# Patient Record
Sex: FEMALE | Race: WHITE | NOT HISPANIC OR LATINO | Employment: FULL TIME | URBAN - METROPOLITAN AREA
[De-identification: names, ages, dates, MRNs, and addresses within clinical notes are randomized per-mention and may not be internally consistent; named-entity substitution may affect disease eponyms.]

---

## 2019-08-02 ENCOUNTER — IMMUNIZATION (OUTPATIENT)
Dept: PHARMACY | Facility: CLINIC | Age: 39
End: 2019-08-02

## 2021-02-11 ENCOUNTER — CLINICAL SUPPORT (OUTPATIENT)
Dept: URGENT CARE | Facility: CLINIC | Age: 41
End: 2021-02-11
Payer: COMMERCIAL

## 2021-02-11 DIAGNOSIS — Z20.822 EXPOSURE TO COVID-19 VIRUS: Primary | ICD-10-CM

## 2021-02-11 LAB
CTP QC/QA: YES
SARS-COV-2 RDRP RESP QL NAA+PROBE: POSITIVE

## 2021-02-11 PROCEDURE — U0002 COVID-19 LAB TEST NON-CDC: HCPCS | Mod: QW,S$GLB,, | Performed by: PHYSICIAN ASSISTANT

## 2021-02-11 PROCEDURE — U0002: ICD-10-PCS | Mod: QW,S$GLB,, | Performed by: PHYSICIAN ASSISTANT

## 2021-02-11 PROCEDURE — 99211 PR OFFICE/OUTPT VISIT, EST, LEVL I: ICD-10-PCS | Mod: S$GLB,,, | Performed by: PHYSICIAN ASSISTANT

## 2021-02-11 PROCEDURE — 99211 OFF/OP EST MAY X REQ PHY/QHP: CPT | Mod: S$GLB,,, | Performed by: PHYSICIAN ASSISTANT

## 2021-02-12 DIAGNOSIS — U07.1 COVID-19 VIRUS DETECTED: ICD-10-CM

## 2021-03-01 ENCOUNTER — OFFICE VISIT (OUTPATIENT)
Dept: INTERNAL MEDICINE | Facility: CLINIC | Age: 41
End: 2021-03-01
Payer: COMMERCIAL

## 2021-03-01 ENCOUNTER — LAB VISIT (OUTPATIENT)
Dept: LAB | Facility: HOSPITAL | Age: 41
End: 2021-03-01
Attending: FAMILY MEDICINE
Payer: COMMERCIAL

## 2021-03-01 VITALS
HEART RATE: 86 BPM | TEMPERATURE: 99 F | DIASTOLIC BLOOD PRESSURE: 96 MMHG | OXYGEN SATURATION: 98 % | HEIGHT: 70 IN | BODY MASS INDEX: 41.95 KG/M2 | WEIGHT: 293 LBS | SYSTOLIC BLOOD PRESSURE: 154 MMHG

## 2021-03-01 DIAGNOSIS — E66.01 MORBID OBESITY WITH BMI OF 50.0-59.9, ADULT: ICD-10-CM

## 2021-03-01 DIAGNOSIS — Z11.4 SCREENING FOR HIV (HUMAN IMMUNODEFICIENCY VIRUS): ICD-10-CM

## 2021-03-01 DIAGNOSIS — Z00.00 ANNUAL PHYSICAL EXAM: Primary | ICD-10-CM

## 2021-03-01 DIAGNOSIS — Z98.84 HISTORY OF GASTRIC BYPASS: ICD-10-CM

## 2021-03-01 DIAGNOSIS — Z12.31 ENCOUNTER FOR SCREENING MAMMOGRAM FOR MALIGNANT NEOPLASM OF BREAST: ICD-10-CM

## 2021-03-01 DIAGNOSIS — Z01.419 ENCOUNTER FOR GYNECOLOGICAL EXAMINATION: ICD-10-CM

## 2021-03-01 DIAGNOSIS — Z00.00 ANNUAL PHYSICAL EXAM: ICD-10-CM

## 2021-03-01 DIAGNOSIS — Z11.59 NEED FOR HEPATITIS C SCREENING TEST: ICD-10-CM

## 2021-03-01 DIAGNOSIS — R03.0 ELEVATED BLOOD PRESSURE READING WITHOUT DIAGNOSIS OF HYPERTENSION: ICD-10-CM

## 2021-03-01 LAB
ERYTHROCYTE [DISTWIDTH] IN BLOOD BY AUTOMATED COUNT: 19.2 % (ref 11.5–14.5)
HCT VFR BLD AUTO: 33.7 % (ref 37–48.5)
HGB BLD-MCNC: 9.3 G/DL (ref 12–16)
MCH RBC QN AUTO: 20.4 PG (ref 27–31)
MCHC RBC AUTO-ENTMCNC: 27.6 G/DL (ref 32–36)
MCV RBC AUTO: 74 FL (ref 82–98)
PLATELET # BLD AUTO: 418 K/UL (ref 150–350)
PMV BLD AUTO: 10.2 FL (ref 9.2–12.9)
RBC # BLD AUTO: 4.56 M/UL (ref 4–5.4)
WBC # BLD AUTO: 10.83 K/UL (ref 3.9–12.7)

## 2021-03-01 PROCEDURE — 80061 LIPID PANEL: CPT | Performed by: FAMILY MEDICINE

## 2021-03-01 PROCEDURE — 86703 HIV-1/HIV-2 1 RESULT ANTBDY: CPT

## 2021-03-01 PROCEDURE — 99999 PR PBB SHADOW E&M-EST. PATIENT-LVL IV: CPT | Mod: PBBFAC,,, | Performed by: FAMILY MEDICINE

## 2021-03-01 PROCEDURE — 99386 PR PREVENTIVE VISIT,NEW,40-64: ICD-10-PCS | Mod: S$GLB,,, | Performed by: FAMILY MEDICINE

## 2021-03-01 PROCEDURE — 83036 HEMOGLOBIN GLYCOSYLATED A1C: CPT

## 2021-03-01 PROCEDURE — 1126F PR PAIN SEVERITY QUANTIFIED, NO PAIN PRESENT: ICD-10-PCS | Mod: S$GLB,,, | Performed by: FAMILY MEDICINE

## 2021-03-01 PROCEDURE — 99999 PR PBB SHADOW E&M-EST. PATIENT-LVL IV: ICD-10-PCS | Mod: PBBFAC,,, | Performed by: FAMILY MEDICINE

## 2021-03-01 PROCEDURE — 3008F BODY MASS INDEX DOCD: CPT | Mod: CPTII,S$GLB,, | Performed by: FAMILY MEDICINE

## 2021-03-01 PROCEDURE — 84443 ASSAY THYROID STIM HORMONE: CPT | Performed by: FAMILY MEDICINE

## 2021-03-01 PROCEDURE — 99386 PREV VISIT NEW AGE 40-64: CPT | Mod: S$GLB,,, | Performed by: FAMILY MEDICINE

## 2021-03-01 PROCEDURE — 86803 HEPATITIS C AB TEST: CPT

## 2021-03-01 PROCEDURE — 3008F PR BODY MASS INDEX (BMI) DOCUMENTED: ICD-10-PCS | Mod: CPTII,S$GLB,, | Performed by: FAMILY MEDICINE

## 2021-03-01 PROCEDURE — 36415 COLL VENOUS BLD VENIPUNCTURE: CPT

## 2021-03-01 PROCEDURE — 80053 COMPREHEN METABOLIC PANEL: CPT | Performed by: FAMILY MEDICINE

## 2021-03-01 PROCEDURE — 1126F AMNT PAIN NOTED NONE PRSNT: CPT | Mod: S$GLB,,, | Performed by: FAMILY MEDICINE

## 2021-03-01 PROCEDURE — 85027 COMPLETE CBC AUTOMATED: CPT

## 2021-03-01 PROCEDURE — 82306 VITAMIN D 25 HYDROXY: CPT | Performed by: FAMILY MEDICINE

## 2021-03-02 LAB
ESTIMATED AVG GLUCOSE: 117 MG/DL (ref 68–131)
HBA1C MFR BLD: 5.7 % (ref 4–5.6)
HCV AB SERPL QL IA: NEGATIVE

## 2021-03-03 LAB
25(OH)D3+25(OH)D2 SERPL-MCNC: 31 NG/ML (ref 30–96)
ALBUMIN SERPL BCP-MCNC: 3.5 G/DL (ref 3.5–5.2)
ALP SERPL-CCNC: 89 U/L (ref 55–135)
ALT SERPL W/O P-5'-P-CCNC: 28 U/L (ref 10–44)
ANION GAP SERPL CALC-SCNC: 9 MMOL/L (ref 8–16)
AST SERPL-CCNC: 24 U/L (ref 10–40)
BILIRUB SERPL-MCNC: 0.3 MG/DL (ref 0.1–1)
BUN SERPL-MCNC: 13 MG/DL (ref 6–20)
CALCIUM SERPL-MCNC: 8.7 MG/DL (ref 8.7–10.5)
CHLORIDE SERPL-SCNC: 104 MMOL/L (ref 95–110)
CHOLEST SERPL-MCNC: 166 MG/DL (ref 120–199)
CHOLEST/HDLC SERPL: 3.3 {RATIO} (ref 2–5)
CO2 SERPL-SCNC: 25 MMOL/L (ref 23–29)
CREAT SERPL-MCNC: 0.9 MG/DL (ref 0.5–1.4)
EST. GFR  (AFRICAN AMERICAN): >60 ML/MIN/1.73 M^2
EST. GFR  (NON AFRICAN AMERICAN): >60 ML/MIN/1.73 M^2
GLUCOSE SERPL-MCNC: 82 MG/DL (ref 70–110)
HDLC SERPL-MCNC: 51 MG/DL (ref 40–75)
HDLC SERPL: 30.7 % (ref 20–50)
HIV 1+2 AB+HIV1 P24 AG SERPL QL IA: NEGATIVE
LDLC SERPL CALC-MCNC: 88.4 MG/DL (ref 63–159)
NONHDLC SERPL-MCNC: 115 MG/DL
POTASSIUM SERPL-SCNC: 4.1 MMOL/L (ref 3.5–5.1)
PROT SERPL-MCNC: 7.4 G/DL (ref 6–8.4)
SODIUM SERPL-SCNC: 138 MMOL/L (ref 136–145)
TRIGL SERPL-MCNC: 133 MG/DL (ref 30–150)
TSH SERPL DL<=0.005 MIU/L-ACNC: 1.2 UIU/ML (ref 0.4–4)

## 2021-03-05 ENCOUNTER — TELEPHONE (OUTPATIENT)
Dept: GASTROENTEROLOGY | Facility: CLINIC | Age: 41
End: 2021-03-05

## 2021-03-29 ENCOUNTER — OFFICE VISIT (OUTPATIENT)
Dept: INTERNAL MEDICINE | Facility: CLINIC | Age: 41
End: 2021-03-29
Payer: COMMERCIAL

## 2021-03-29 VITALS
SYSTOLIC BLOOD PRESSURE: 140 MMHG | TEMPERATURE: 99 F | OXYGEN SATURATION: 97 % | BODY MASS INDEX: 43.4 KG/M2 | DIASTOLIC BLOOD PRESSURE: 84 MMHG | WEIGHT: 293 LBS | HEIGHT: 69 IN | HEART RATE: 89 BPM

## 2021-03-29 DIAGNOSIS — Z98.84 HISTORY OF GASTRIC BYPASS: ICD-10-CM

## 2021-03-29 DIAGNOSIS — R73.03 PREDIABETES: Primary | ICD-10-CM

## 2021-03-29 DIAGNOSIS — E66.01 MORBID OBESITY WITH BMI OF 50.0-59.9, ADULT: ICD-10-CM

## 2021-03-29 DIAGNOSIS — R03.0 ELEVATED BLOOD PRESSURE READING WITHOUT DIAGNOSIS OF HYPERTENSION: ICD-10-CM

## 2021-03-29 PROCEDURE — 3008F BODY MASS INDEX DOCD: CPT | Mod: CPTII,S$GLB,, | Performed by: FAMILY MEDICINE

## 2021-03-29 PROCEDURE — 1126F PR PAIN SEVERITY QUANTIFIED, NO PAIN PRESENT: ICD-10-PCS | Mod: S$GLB,,, | Performed by: FAMILY MEDICINE

## 2021-03-29 PROCEDURE — 99214 OFFICE O/P EST MOD 30 MIN: CPT | Mod: S$GLB,,, | Performed by: FAMILY MEDICINE

## 2021-03-29 PROCEDURE — 99214 PR OFFICE/OUTPT VISIT, EST, LEVL IV, 30-39 MIN: ICD-10-PCS | Mod: S$GLB,,, | Performed by: FAMILY MEDICINE

## 2021-03-29 PROCEDURE — 3008F PR BODY MASS INDEX (BMI) DOCUMENTED: ICD-10-PCS | Mod: CPTII,S$GLB,, | Performed by: FAMILY MEDICINE

## 2021-03-29 PROCEDURE — 1126F AMNT PAIN NOTED NONE PRSNT: CPT | Mod: S$GLB,,, | Performed by: FAMILY MEDICINE

## 2021-03-29 PROCEDURE — 99999 PR PBB SHADOW E&M-EST. PATIENT-LVL IV: CPT | Mod: PBBFAC,,, | Performed by: FAMILY MEDICINE

## 2021-03-29 PROCEDURE — 99999 PR PBB SHADOW E&M-EST. PATIENT-LVL IV: ICD-10-PCS | Mod: PBBFAC,,, | Performed by: FAMILY MEDICINE

## 2021-03-29 RX ORDER — HYDROCHLOROTHIAZIDE 25 MG/1
25 TABLET ORAL DAILY
Qty: 90 TABLET | Refills: 0 | Status: SHIPPED | OUTPATIENT
Start: 2021-03-29 | End: 2021-06-21 | Stop reason: SDUPTHER

## 2021-05-05 ENCOUNTER — PATIENT OUTREACH (OUTPATIENT)
Dept: ADMINISTRATIVE | Facility: OTHER | Age: 41
End: 2021-05-05

## 2021-05-06 ENCOUNTER — OFFICE VISIT (OUTPATIENT)
Dept: GASTROENTEROLOGY | Facility: CLINIC | Age: 41
End: 2021-05-06
Payer: COMMERCIAL

## 2021-05-06 ENCOUNTER — TELEPHONE (OUTPATIENT)
Dept: GASTROENTEROLOGY | Facility: CLINIC | Age: 41
End: 2021-05-06

## 2021-05-06 VITALS
WEIGHT: 293 LBS | HEART RATE: 87 BPM | SYSTOLIC BLOOD PRESSURE: 120 MMHG | OXYGEN SATURATION: 98 % | BODY MASS INDEX: 43.4 KG/M2 | DIASTOLIC BLOOD PRESSURE: 84 MMHG | HEIGHT: 69 IN

## 2021-05-06 DIAGNOSIS — Z98.84 HISTORY OF GASTRIC BYPASS: ICD-10-CM

## 2021-05-06 DIAGNOSIS — Z98.84 HISTORY OF GASTRIC BYPASS: Primary | ICD-10-CM

## 2021-05-06 DIAGNOSIS — E66.01 CLASS 3 SEVERE OBESITY WITH BODY MASS INDEX (BMI) OF 50.0 TO 59.9 IN ADULT, UNSPECIFIED OBESITY TYPE, UNSPECIFIED WHETHER SERIOUS COMORBIDITY PRESENT: Primary | ICD-10-CM

## 2021-05-06 PROCEDURE — 99204 OFFICE O/P NEW MOD 45 MIN: CPT | Mod: S$GLB,,, | Performed by: INTERNAL MEDICINE

## 2021-05-06 PROCEDURE — 99999 PR PBB SHADOW E&M-EST. PATIENT-LVL IV: ICD-10-PCS | Mod: PBBFAC,,, | Performed by: INTERNAL MEDICINE

## 2021-05-06 PROCEDURE — 99204 PR OFFICE/OUTPT VISIT, NEW, LEVL IV, 45-59 MIN: ICD-10-PCS | Mod: S$GLB,,, | Performed by: INTERNAL MEDICINE

## 2021-05-06 PROCEDURE — 3008F BODY MASS INDEX DOCD: CPT | Mod: CPTII,S$GLB,, | Performed by: INTERNAL MEDICINE

## 2021-05-06 PROCEDURE — 1126F PR PAIN SEVERITY QUANTIFIED, NO PAIN PRESENT: ICD-10-PCS | Mod: S$GLB,,, | Performed by: INTERNAL MEDICINE

## 2021-05-06 PROCEDURE — 3008F PR BODY MASS INDEX (BMI) DOCUMENTED: ICD-10-PCS | Mod: CPTII,S$GLB,, | Performed by: INTERNAL MEDICINE

## 2021-05-06 PROCEDURE — 1126F AMNT PAIN NOTED NONE PRSNT: CPT | Mod: S$GLB,,, | Performed by: INTERNAL MEDICINE

## 2021-05-06 PROCEDURE — 99999 PR PBB SHADOW E&M-EST. PATIENT-LVL IV: CPT | Mod: PBBFAC,,, | Performed by: INTERNAL MEDICINE

## 2021-05-28 ENCOUNTER — TELEPHONE (OUTPATIENT)
Dept: PREADMISSION TESTING | Facility: HOSPITAL | Age: 41
End: 2021-05-28

## 2021-06-21 DIAGNOSIS — R03.0 ELEVATED BLOOD PRESSURE READING WITHOUT DIAGNOSIS OF HYPERTENSION: ICD-10-CM

## 2021-06-21 RX ORDER — HYDROCHLOROTHIAZIDE 25 MG/1
25 TABLET ORAL DAILY
Qty: 90 TABLET | Refills: 3 | Status: SHIPPED | OUTPATIENT
Start: 2021-06-21 | End: 2022-07-03 | Stop reason: SDUPTHER

## 2021-09-16 ENCOUNTER — HOSPITAL ENCOUNTER (OUTPATIENT)
Dept: CARDIOLOGY | Facility: HOSPITAL | Age: 41
Discharge: HOME | End: 2021-09-16
Attending: STUDENT IN AN ORGANIZED HEALTH CARE EDUCATION/TRAINING PROGRAM
Payer: COMMERCIAL

## 2021-09-16 ENCOUNTER — OFFICE VISIT (OUTPATIENT)
Dept: CARDIOLOGY | Facility: CLINIC | Age: 41
End: 2021-09-16
Payer: COMMERCIAL

## 2021-09-16 VITALS
SYSTOLIC BLOOD PRESSURE: 130 MMHG | RESPIRATION RATE: 16 BRPM | OXYGEN SATURATION: 98 % | WEIGHT: 293 LBS | DIASTOLIC BLOOD PRESSURE: 80 MMHG | HEART RATE: 80 BPM | BODY MASS INDEX: 41.95 KG/M2 | HEIGHT: 70 IN

## 2021-09-16 DIAGNOSIS — R07.82 INTERCOSTAL PAIN: ICD-10-CM

## 2021-09-16 DIAGNOSIS — R07.9 CHEST PAIN, UNSPECIFIED TYPE: Primary | ICD-10-CM

## 2021-09-16 DIAGNOSIS — Z98.84 H/O GASTRIC BYPASS: Chronic | ICD-10-CM

## 2021-09-16 DIAGNOSIS — R07.9 CHEST PAIN, UNSPECIFIED TYPE: ICD-10-CM

## 2021-09-16 DIAGNOSIS — R07.89 OTHER CHEST PAIN: Primary | ICD-10-CM

## 2021-09-16 DIAGNOSIS — E66.01 MORBID OBESITY WITH BMI OF 45.0-49.9, ADULT: Chronic | ICD-10-CM

## 2021-09-16 DIAGNOSIS — U07.1 COVID-19 VIRUS INFECTION: ICD-10-CM

## 2021-09-16 PROCEDURE — 93005 ELECTROCARDIOGRAM TRACING: CPT

## 2021-09-16 PROCEDURE — 3075F SYST BP GE 130 - 139MM HG: CPT | Mod: CPTII,S$GLB,, | Performed by: STUDENT IN AN ORGANIZED HEALTH CARE EDUCATION/TRAINING PROGRAM

## 2021-09-16 PROCEDURE — 1159F PR MEDICATION LIST DOCUMENTED IN MEDICAL RECORD: ICD-10-PCS | Mod: CPTII,S$GLB,, | Performed by: STUDENT IN AN ORGANIZED HEALTH CARE EDUCATION/TRAINING PROGRAM

## 2021-09-16 PROCEDURE — 3075F PR MOST RECENT SYSTOLIC BLOOD PRESS GE 130-139MM HG: ICD-10-PCS | Mod: CPTII,S$GLB,, | Performed by: STUDENT IN AN ORGANIZED HEALTH CARE EDUCATION/TRAINING PROGRAM

## 2021-09-16 PROCEDURE — 3079F DIAST BP 80-89 MM HG: CPT | Mod: CPTII,S$GLB,, | Performed by: STUDENT IN AN ORGANIZED HEALTH CARE EDUCATION/TRAINING PROGRAM

## 2021-09-16 PROCEDURE — 93010 EKG 12-LEAD: ICD-10-PCS | Mod: ,,, | Performed by: INTERNAL MEDICINE

## 2021-09-16 PROCEDURE — 3079F PR MOST RECENT DIASTOLIC BLOOD PRESSURE 80-89 MM HG: ICD-10-PCS | Mod: CPTII,S$GLB,, | Performed by: STUDENT IN AN ORGANIZED HEALTH CARE EDUCATION/TRAINING PROGRAM

## 2021-09-16 PROCEDURE — 93010 ELECTROCARDIOGRAM REPORT: CPT | Mod: ,,, | Performed by: INTERNAL MEDICINE

## 2021-09-16 PROCEDURE — 99203 PR OFFICE/OUTPT VISIT, NEW, LEVL III, 30-44 MIN: ICD-10-PCS | Mod: S$GLB,,, | Performed by: STUDENT IN AN ORGANIZED HEALTH CARE EDUCATION/TRAINING PROGRAM

## 2021-09-16 PROCEDURE — 99999 PR PBB SHADOW E&M-NEW PATIENT-LVL III: CPT | Mod: PBBFAC,,, | Performed by: STUDENT IN AN ORGANIZED HEALTH CARE EDUCATION/TRAINING PROGRAM

## 2021-09-16 PROCEDURE — 99203 OFFICE O/P NEW LOW 30 MIN: CPT | Mod: S$GLB,,, | Performed by: STUDENT IN AN ORGANIZED HEALTH CARE EDUCATION/TRAINING PROGRAM

## 2021-09-16 PROCEDURE — 1159F MED LIST DOCD IN RCRD: CPT | Mod: CPTII,S$GLB,, | Performed by: STUDENT IN AN ORGANIZED HEALTH CARE EDUCATION/TRAINING PROGRAM

## 2021-09-16 PROCEDURE — 3008F BODY MASS INDEX DOCD: CPT | Mod: CPTII,S$GLB,, | Performed by: STUDENT IN AN ORGANIZED HEALTH CARE EDUCATION/TRAINING PROGRAM

## 2021-09-16 PROCEDURE — 3008F PR BODY MASS INDEX (BMI) DOCUMENTED: ICD-10-PCS | Mod: CPTII,S$GLB,, | Performed by: STUDENT IN AN ORGANIZED HEALTH CARE EDUCATION/TRAINING PROGRAM

## 2021-09-16 PROCEDURE — 99999 PR PBB SHADOW E&M-NEW PATIENT-LVL III: ICD-10-PCS | Mod: PBBFAC,,, | Performed by: STUDENT IN AN ORGANIZED HEALTH CARE EDUCATION/TRAINING PROGRAM

## 2021-09-16 RX ORDER — HYDROCHLOROTHIAZIDE 25 MG/1
TABLET ORAL
COMMUNITY
Start: 2021-03-19 | End: 2022-07-05

## 2021-09-21 ENCOUNTER — LAB VISIT (OUTPATIENT)
Dept: LAB | Facility: HOSPITAL | Age: 41
End: 2021-09-21
Attending: STUDENT IN AN ORGANIZED HEALTH CARE EDUCATION/TRAINING PROGRAM
Payer: COMMERCIAL

## 2021-09-21 DIAGNOSIS — R07.89 OTHER CHEST PAIN: ICD-10-CM

## 2021-09-21 LAB
ALBUMIN SERPL BCP-MCNC: 3.7 G/DL (ref 3.5–5.2)
ALP SERPL-CCNC: 66 U/L (ref 55–135)
ALT SERPL W/O P-5'-P-CCNC: 25 U/L (ref 10–44)
ANION GAP SERPL CALC-SCNC: 8 MMOL/L (ref 8–16)
AST SERPL-CCNC: 16 U/L (ref 10–40)
BASOPHILS # BLD AUTO: 0.05 K/UL (ref 0–0.2)
BASOPHILS NFR BLD: 0.6 % (ref 0–1.9)
BILIRUB SERPL-MCNC: 0.6 MG/DL (ref 0.1–1)
BUN SERPL-MCNC: 15 MG/DL (ref 6–20)
CALCIUM SERPL-MCNC: 9.4 MG/DL (ref 8.7–10.5)
CHLORIDE SERPL-SCNC: 104 MMOL/L (ref 95–110)
CHOLEST SERPL-MCNC: 157 MG/DL (ref 120–199)
CHOLEST/HDLC SERPL: 3.9 {RATIO} (ref 2–5)
CO2 SERPL-SCNC: 27 MMOL/L (ref 23–29)
CREAT SERPL-MCNC: 1 MG/DL (ref 0.5–1.4)
DIFFERENTIAL METHOD: ABNORMAL
EOSINOPHIL # BLD AUTO: 0.3 K/UL (ref 0–0.5)
EOSINOPHIL NFR BLD: 3.9 % (ref 0–8)
ERYTHROCYTE [DISTWIDTH] IN BLOOD BY AUTOMATED COUNT: 15 % (ref 11.5–14.5)
EST. GFR  (AFRICAN AMERICAN): >60 ML/MIN/1.73 M^2
EST. GFR  (NON AFRICAN AMERICAN): >60 ML/MIN/1.73 M^2
GLUCOSE SERPL-MCNC: 109 MG/DL (ref 70–110)
HCT VFR BLD AUTO: 44.8 % (ref 37–48.5)
HDLC SERPL-MCNC: 40 MG/DL (ref 40–75)
HDLC SERPL: 25.5 % (ref 20–50)
HGB BLD-MCNC: 14.5 G/DL (ref 12–16)
IMM GRANULOCYTES # BLD AUTO: 0.02 K/UL (ref 0–0.04)
IMM GRANULOCYTES NFR BLD AUTO: 0.2 % (ref 0–0.5)
LDLC SERPL CALC-MCNC: 87.4 MG/DL (ref 63–159)
LYMPHOCYTES # BLD AUTO: 2.1 K/UL (ref 1–4.8)
LYMPHOCYTES NFR BLD: 24.2 % (ref 18–48)
MAGNESIUM SERPL-MCNC: 2.1 MG/DL (ref 1.6–2.6)
MCH RBC QN AUTO: 29.2 PG (ref 27–31)
MCHC RBC AUTO-ENTMCNC: 32.4 G/DL (ref 32–36)
MCV RBC AUTO: 90 FL (ref 82–98)
MONOCYTES # BLD AUTO: 0.5 K/UL (ref 0.3–1)
MONOCYTES NFR BLD: 5.7 % (ref 4–15)
NEUTROPHILS # BLD AUTO: 5.8 K/UL (ref 1.8–7.7)
NEUTROPHILS NFR BLD: 65.4 % (ref 38–73)
NONHDLC SERPL-MCNC: 117 MG/DL
NRBC BLD-RTO: 0 /100 WBC
PLATELET # BLD AUTO: 368 K/UL (ref 150–450)
PMV BLD AUTO: 9.7 FL (ref 9.2–12.9)
POTASSIUM SERPL-SCNC: 4 MMOL/L (ref 3.5–5.1)
PROT SERPL-MCNC: 7.7 G/DL (ref 6–8.4)
RBC # BLD AUTO: 4.96 M/UL (ref 4–5.4)
SODIUM SERPL-SCNC: 139 MMOL/L (ref 136–145)
TRIGL SERPL-MCNC: 148 MG/DL (ref 30–150)
TSH SERPL DL<=0.005 MIU/L-ACNC: 1.68 UIU/ML (ref 0.4–4)
WBC # BLD AUTO: 8.8 K/UL (ref 3.9–12.7)

## 2021-09-21 PROCEDURE — 80053 COMPREHEN METABOLIC PANEL: CPT | Performed by: STUDENT IN AN ORGANIZED HEALTH CARE EDUCATION/TRAINING PROGRAM

## 2021-09-21 PROCEDURE — 84443 ASSAY THYROID STIM HORMONE: CPT | Performed by: STUDENT IN AN ORGANIZED HEALTH CARE EDUCATION/TRAINING PROGRAM

## 2021-09-21 PROCEDURE — 80061 LIPID PANEL: CPT | Performed by: STUDENT IN AN ORGANIZED HEALTH CARE EDUCATION/TRAINING PROGRAM

## 2021-09-21 PROCEDURE — 36415 COLL VENOUS BLD VENIPUNCTURE: CPT | Performed by: STUDENT IN AN ORGANIZED HEALTH CARE EDUCATION/TRAINING PROGRAM

## 2021-09-21 PROCEDURE — 85025 COMPLETE CBC W/AUTO DIFF WBC: CPT | Performed by: STUDENT IN AN ORGANIZED HEALTH CARE EDUCATION/TRAINING PROGRAM

## 2021-09-21 PROCEDURE — 83735 ASSAY OF MAGNESIUM: CPT | Performed by: STUDENT IN AN ORGANIZED HEALTH CARE EDUCATION/TRAINING PROGRAM

## 2021-12-30 ENCOUNTER — PATIENT MESSAGE (OUTPATIENT)
Dept: INTERNAL MEDICINE | Facility: CLINIC | Age: 41
End: 2021-12-30

## 2021-12-30 ENCOUNTER — OFFICE VISIT (OUTPATIENT)
Dept: INTERNAL MEDICINE | Facility: CLINIC | Age: 41
End: 2021-12-30
Payer: COMMERCIAL

## 2021-12-30 VITALS
BODY MASS INDEX: 41.02 KG/M2 | HEART RATE: 90 BPM | OXYGEN SATURATION: 95 % | SYSTOLIC BLOOD PRESSURE: 120 MMHG | TEMPERATURE: 98 F | WEIGHT: 293 LBS | DIASTOLIC BLOOD PRESSURE: 80 MMHG | HEIGHT: 71 IN

## 2021-12-30 DIAGNOSIS — F41.1 GENERALIZED ANXIETY DISORDER: ICD-10-CM

## 2021-12-30 DIAGNOSIS — Z86.16 HISTORY OF COVID-19: ICD-10-CM

## 2021-12-30 DIAGNOSIS — L20.84 INTRINSIC ECZEMA: ICD-10-CM

## 2021-12-30 DIAGNOSIS — I10 ESSENTIAL HYPERTENSION: Chronic | ICD-10-CM

## 2021-12-30 DIAGNOSIS — F33.0 MAJOR DEPRESSIVE DISORDER, RECURRENT, MILD: ICD-10-CM

## 2021-12-30 DIAGNOSIS — R73.03 PREDIABETES: ICD-10-CM

## 2021-12-30 DIAGNOSIS — Z98.84 H/O GASTRIC BYPASS: Chronic | ICD-10-CM

## 2021-12-30 DIAGNOSIS — E66.01 MORBID OBESITY WITH BMI OF 45.0-49.9, ADULT: Primary | Chronic | ICD-10-CM

## 2021-12-30 DIAGNOSIS — Z00.00 PREVENTATIVE HEALTH CARE: ICD-10-CM

## 2021-12-30 PROCEDURE — 3008F PR BODY MASS INDEX (BMI) DOCUMENTED: ICD-10-PCS | Mod: CPTII,S$GLB,, | Performed by: FAMILY MEDICINE

## 2021-12-30 PROCEDURE — 3008F BODY MASS INDEX DOCD: CPT | Mod: CPTII,S$GLB,, | Performed by: FAMILY MEDICINE

## 2021-12-30 PROCEDURE — 3079F DIAST BP 80-89 MM HG: CPT | Mod: CPTII,S$GLB,, | Performed by: FAMILY MEDICINE

## 2021-12-30 PROCEDURE — 99999 PR PBB SHADOW E&M-EST. PATIENT-LVL IV: ICD-10-PCS | Mod: PBBFAC,,, | Performed by: FAMILY MEDICINE

## 2021-12-30 PROCEDURE — 3079F PR MOST RECENT DIASTOLIC BLOOD PRESSURE 80-89 MM HG: ICD-10-PCS | Mod: CPTII,S$GLB,, | Performed by: FAMILY MEDICINE

## 2021-12-30 PROCEDURE — 3044F HG A1C LEVEL LT 7.0%: CPT | Mod: CPTII,S$GLB,, | Performed by: FAMILY MEDICINE

## 2021-12-30 PROCEDURE — 3074F PR MOST RECENT SYSTOLIC BLOOD PRESSURE < 130 MM HG: ICD-10-PCS | Mod: CPTII,S$GLB,, | Performed by: FAMILY MEDICINE

## 2021-12-30 PROCEDURE — 1159F MED LIST DOCD IN RCRD: CPT | Mod: CPTII,S$GLB,, | Performed by: FAMILY MEDICINE

## 2021-12-30 PROCEDURE — 1159F PR MEDICATION LIST DOCUMENTED IN MEDICAL RECORD: ICD-10-PCS | Mod: CPTII,S$GLB,, | Performed by: FAMILY MEDICINE

## 2021-12-30 PROCEDURE — 3044F PR MOST RECENT HEMOGLOBIN A1C LEVEL <7.0%: ICD-10-PCS | Mod: CPTII,S$GLB,, | Performed by: FAMILY MEDICINE

## 2021-12-30 PROCEDURE — 99396 PREV VISIT EST AGE 40-64: CPT | Mod: S$GLB,,, | Performed by: FAMILY MEDICINE

## 2021-12-30 PROCEDURE — 99999 PR PBB SHADOW E&M-EST. PATIENT-LVL IV: CPT | Mod: PBBFAC,,, | Performed by: FAMILY MEDICINE

## 2021-12-30 PROCEDURE — 99396 PR PREVENTIVE VISIT,EST,40-64: ICD-10-PCS | Mod: S$GLB,,, | Performed by: FAMILY MEDICINE

## 2021-12-30 PROCEDURE — 3074F SYST BP LT 130 MM HG: CPT | Mod: CPTII,S$GLB,, | Performed by: FAMILY MEDICINE

## 2021-12-30 RX ORDER — MOMETASONE FUROATE 1 MG/G
CREAM TOPICAL DAILY
Qty: 45 G | Refills: 1 | Status: SHIPPED | OUTPATIENT
Start: 2021-12-30 | End: 2023-04-12 | Stop reason: ALTCHOICE

## 2021-12-30 RX ORDER — CLONAZEPAM 0.5 MG/1
.25-.5 TABLET ORAL 2 TIMES DAILY PRN
Qty: 60 TABLET | Refills: 1 | Status: SHIPPED | OUTPATIENT
Start: 2021-12-30 | End: 2023-04-12

## 2021-12-30 RX ORDER — SERTRALINE HYDROCHLORIDE 50 MG/1
50 TABLET, FILM COATED ORAL DAILY
Qty: 30 TABLET | Refills: 11 | Status: SHIPPED | OUTPATIENT
Start: 2021-12-30 | End: 2023-01-29 | Stop reason: SDUPTHER

## 2022-02-23 ENCOUNTER — PATIENT OUTREACH (OUTPATIENT)
Dept: ADMINISTRATIVE | Facility: HOSPITAL | Age: 42
End: 2022-02-23
Payer: COMMERCIAL

## 2022-03-02 ENCOUNTER — PATIENT MESSAGE (OUTPATIENT)
Dept: RESEARCH | Facility: HOSPITAL | Age: 42
End: 2022-03-02
Payer: COMMERCIAL

## 2022-03-14 ENCOUNTER — PATIENT MESSAGE (OUTPATIENT)
Dept: PSYCHIATRY | Facility: CLINIC | Age: 42
End: 2022-03-14
Payer: COMMERCIAL

## 2022-05-23 ENCOUNTER — PATIENT OUTREACH (OUTPATIENT)
Dept: ADMINISTRATIVE | Facility: HOSPITAL | Age: 42
End: 2022-05-23
Payer: COMMERCIAL

## 2022-07-01 ENCOUNTER — PATIENT OUTREACH (OUTPATIENT)
Dept: ADMINISTRATIVE | Facility: HOSPITAL | Age: 42
End: 2022-07-01
Payer: COMMERCIAL

## 2022-07-01 NOTE — PROGRESS NOTES
Cervical Gap BR REPORT: No answer left a message to callback and schedule pap smear and mammogram.    No results found in [facility], [email or web address] or [facility] Everywhere

## 2022-07-03 DIAGNOSIS — R03.0 ELEVATED BLOOD PRESSURE READING WITHOUT DIAGNOSIS OF HYPERTENSION: ICD-10-CM

## 2022-07-03 NOTE — TELEPHONE ENCOUNTER
Care Due:                  Date            Visit Type   Department     Provider  --------------------------------------------------------------------------------                                NP -                              PRIMARY      [facility] INTER[facility]  Last Visit: 12-      CARE ([facility])   MEDICINE       Gaurang Lawrence  Next Visit: None Scheduled  None         None Found                                                            Last  Test          Frequency    Reason                     Performed    Due Date  --------------------------------------------------------------------------------    CMP.........  12 months..  hydroCHLOROthiazide......  09- 09-    [facility] Embedded Care Gaps. Reference number: [id number]. 7/03/2022   6:53:00 PM CDT

## 2022-07-05 RX ORDER — HYDROCHLOROTHIAZIDE 25 MG/1
25 TABLET ORAL DAILY
Qty: 90 TABLET | Refills: 3 | Status: SHIPPED | OUTPATIENT
Start: 2022-07-05 | End: 2023-04-12 | Stop reason: SDUPTHER

## 2022-08-24 DIAGNOSIS — Z12.31 OTHER SCREENING MAMMOGRAM: ICD-10-CM

## 2022-09-27 DIAGNOSIS — I10 ESSENTIAL HYPERTENSION: ICD-10-CM

## 2022-10-20 ENCOUNTER — PATIENT MESSAGE (OUTPATIENT)
Dept: ADMINISTRATIVE | Facility: HOSPITAL | Age: 42
End: 2022-10-20
Payer: COMMERCIAL

## 2023-01-25 ENCOUNTER — PATIENT MESSAGE (OUTPATIENT)
Dept: ADMINISTRATIVE | Facility: HOSPITAL | Age: 43
End: 2023-01-25
Payer: COMMERCIAL

## 2023-01-29 DIAGNOSIS — F41.1 GENERALIZED ANXIETY DISORDER: ICD-10-CM

## 2023-01-29 DIAGNOSIS — F33.0 MAJOR DEPRESSIVE DISORDER, RECURRENT, MILD: ICD-10-CM

## 2023-01-29 NOTE — TELEPHONE ENCOUNTER
Care Due:                  Date            Visit Type   Department     Provider  --------------------------------------------------------------------------------                                NP -                              PRIMARY      [facility] INTER[facility]  Last Visit: 12-      CARE ([facility])   MEDICINE       Gaurang Lawrence  Next Visit: None Scheduled  None         None Found                                                            Last  Test          Frequency    Reason                     Performed    Due Date  --------------------------------------------------------------------------------    Office Visit  12 months..  hydroCHLOROthiazide,       12- 12-                             sertraline...............    CMP.........  12 months..  hydroCHLOROthiazide......  09- 09-    [facility] Embedded Care Gaps. Reference number: [id number]. 1/29/2023   8:31:13 AM CST

## 2023-01-30 RX ORDER — CLONAZEPAM 0.5 MG/1
.25-.5 TABLET ORAL 2 TIMES DAILY PRN
Qty: 60 TABLET | Refills: 1 | OUTPATIENT
Start: 2023-01-30 | End: 2024-01-30

## 2023-01-30 RX ORDER — SERTRALINE HYDROCHLORIDE 50 MG/1
50 TABLET, FILM COATED ORAL DAILY
Qty: 30 TABLET | Refills: 0 | Status: SHIPPED | OUTPATIENT
Start: 2023-01-30 | End: 2023-04-12 | Stop reason: SDUPTHER

## 2023-01-30 NOTE — TELEPHONE ENCOUNTER
Zoloft approved 30 day supply only, needs an appointment  Klonopin decline, has not been seen within last 12 months

## 2023-02-06 ENCOUNTER — PATIENT OUTREACH (OUTPATIENT)
Dept: ADMINISTRATIVE | Facility: HOSPITAL | Age: 43
End: 2023-02-06
Payer: COMMERCIAL

## 2023-02-06 NOTE — PROGRESS NOTES
HTN Report: Attempted to contact the patient to obtain a home BP reading and schedule annual exam with PCP, no answer, left voicemail.

## 2023-03-03 DIAGNOSIS — F33.0 MAJOR DEPRESSIVE DISORDER, RECURRENT, MILD: ICD-10-CM

## 2023-03-03 DIAGNOSIS — F41.1 GENERALIZED ANXIETY DISORDER: ICD-10-CM

## 2023-03-04 NOTE — TELEPHONE ENCOUNTER
No new care gaps identified.  [facility] Embedded Care Gaps. Reference number: [id number]. 3/03/2023   6:59:41 PM CST

## 2023-03-06 RX ORDER — SERTRALINE HYDROCHLORIDE 50 MG/1
50 TABLET, FILM COATED ORAL DAILY
Qty: 30 TABLET | Refills: 0 | OUTPATIENT
Start: 2023-03-06 | End: 2023-04-05

## 2023-03-13 DIAGNOSIS — F41.1 GENERALIZED ANXIETY DISORDER: ICD-10-CM

## 2023-03-13 DIAGNOSIS — F33.0 MAJOR DEPRESSIVE DISORDER, RECURRENT, MILD: ICD-10-CM

## 2023-03-13 NOTE — TELEPHONE ENCOUNTER
No new care gaps identified.  [facility] Embedded Care Gaps. Reference number: [id number]. 3/13/2023   2:01:13 PM CDT

## 2023-03-14 RX ORDER — SERTRALINE HYDROCHLORIDE 50 MG/1
50 TABLET, FILM COATED ORAL DAILY
Qty: 30 TABLET | Refills: 0 | OUTPATIENT
Start: 2023-03-14 | End: 2023-04-13

## 2023-04-04 DIAGNOSIS — F33.0 MAJOR DEPRESSIVE DISORDER, RECURRENT, MILD: ICD-10-CM

## 2023-04-04 DIAGNOSIS — F41.1 GENERALIZED ANXIETY DISORDER: ICD-10-CM

## 2023-04-04 RX ORDER — SERTRALINE HYDROCHLORIDE 50 MG/1
50 TABLET, FILM COATED ORAL DAILY
Qty: 30 TABLET | Refills: 0 | OUTPATIENT
Start: 2023-04-04 | End: 2023-05-04

## 2023-04-04 RX ORDER — SERTRALINE HYDROCHLORIDE 50 MG/1
50 TABLET, FILM COATED ORAL DAILY
Qty: 30 TABLET | Refills: 0 | Status: CANCELLED | OUTPATIENT
Start: 2023-04-04 | End: 2023-05-04

## 2023-04-04 NOTE — TELEPHONE ENCOUNTER
Care Due:                  Date            Visit Type   Department     Provider  --------------------------------------------------------------------------------                                NP -                              PRIMARY      [facility] INTER[facility]  Last Visit: 12-      CARE ([facility])   MEDICINE       Gaurang Lawrence  Next Visit: None Scheduled  None         None Found                                                            Last  Test          Frequency    Reason                     Performed    Due Date  --------------------------------------------------------------------------------    Office Visit  12 months..  hydroCHLOROthiazide,       12- 12-                             sertraline...............    CMP.........  12 months..  hydroCHLOROthiazide......  09- 09-    [facility] Embedded Care Gaps. Reference number: [id number]. 4/04/2023   10:12:31 AM CDT

## 2023-04-04 NOTE — TELEPHONE ENCOUNTER
No new care gaps identified.  [facility] Embedded Care Gaps. Reference number: [id number]. 4/04/2023   10:13:02 AM GLENROYT

## 2023-04-04 NOTE — TELEPHONE ENCOUNTER
Refill Routing Note   Medication(s) are not appropriate for processing by [facility] for the following reason(s):      Patient not seen by PCP within 15 months    [facility] action(s):  Defer       Medication Therapy Plan: LOV with PCP 3/29/21      Appointments  past 12m or future 3m with PCP    Date Provider   Last Visit   3/29/2021 Alvaro Zurita MD   Next Visit   Visit date not found Alvaro Zurita MD   ED visits in past 90 days: 0        Note composed:1:39 PM 04/04/2023

## 2023-04-05 DIAGNOSIS — F41.1 GENERALIZED ANXIETY DISORDER: ICD-10-CM

## 2023-04-05 DIAGNOSIS — F33.0 MAJOR DEPRESSIVE DISORDER, RECURRENT, MILD: ICD-10-CM

## 2023-04-05 NOTE — TELEPHONE ENCOUNTER
No new care gaps identified.  [facility] Embedded Care Gaps. Reference number: [id number]. 4/05/2023   12:01:24 PM CDT

## 2023-04-06 ENCOUNTER — PATIENT OUTREACH (OUTPATIENT)
Dept: ADMINISTRATIVE | Facility: HOSPITAL | Age: 43
End: 2023-04-06

## 2023-04-06 ENCOUNTER — TELEPHONE (OUTPATIENT)
Dept: INTERNAL MEDICINE | Facility: CLINIC | Age: 43
End: 2023-04-06

## 2023-04-06 RX ORDER — SERTRALINE HYDROCHLORIDE 50 MG/1
50 TABLET, FILM COATED ORAL DAILY
Qty: 30 TABLET | Refills: 0 | OUTPATIENT
Start: 2023-04-06 | End: 2023-05-06

## 2023-04-06 NOTE — PROGRESS NOTES
HTN Report: Per chart review pt is overdue for PCP visit and bp check, attempted to contact pt no ans, lvm.

## 2023-04-06 NOTE — TELEPHONE ENCOUNTER
----- Message from Jose J Russell sent at 4/6/2023  2:37 PM CDT -----  Contact: sharon  Patient is returning a call. She needs to know why she has to schedule a follow up appointment and patient also needs refills. Please call her back at [phone].      Thanks  DD

## 2023-04-11 DIAGNOSIS — F41.1 GENERALIZED ANXIETY DISORDER: ICD-10-CM

## 2023-04-11 DIAGNOSIS — F33.0 MAJOR DEPRESSIVE DISORDER, RECURRENT, MILD: ICD-10-CM

## 2023-04-11 RX ORDER — SERTRALINE HYDROCHLORIDE 50 MG/1
50 TABLET, FILM COATED ORAL DAILY
Qty: 30 TABLET | Refills: 0 | Status: CANCELLED | OUTPATIENT
Start: 2023-04-11 | End: 2023-05-11

## 2023-04-11 NOTE — TELEPHONE ENCOUNTER
----- Message from Gloria Saravia sent at 4/11/2023  2:18 PM CDT -----  Contact: Yamila Driscoll would like a call back at [phone], in regards to medication, Sertraline (ZOLOFT) 50 MG tablet needing approval for it to be filled. Patient has been calling and having messages sent over for 3 weeks and no one has gotten back with her. The medication needs to be sent to     [facility]  [id number] [place], [place] [address] [address]  Phone: [phone] Fax: [phone]    Thanks   Am

## 2023-04-11 NOTE — TELEPHONE ENCOUNTER
No new care gaps identified.  [facility] Embedded Care Gaps. Reference number: [id number]. 4/11/2023   2:14:19 PM CDT

## 2023-04-11 NOTE — TELEPHONE ENCOUNTER
No new care gaps identified.  [facility] Embedded Care Gaps. Reference number: [id number]. 4/11/2023   3:44:23 PM CDT

## 2023-04-11 NOTE — TELEPHONE ENCOUNTER
Attempt to contact pt regarding refill medication & scheduling appt; pt hasn't been seen over 2 yrs; no answer; lvm for callback /LD

## 2023-04-11 NOTE — TELEPHONE ENCOUNTER
No new care gaps identified.  [facility] Embedded Care Gaps. Reference number: [id number]. 4/11/2023   2:16:20 PM CDT

## 2023-04-12 ENCOUNTER — OFFICE VISIT (OUTPATIENT)
Dept: INTERNAL MEDICINE | Facility: CLINIC | Age: 43
End: 2023-04-12
Payer: COMMERCIAL

## 2023-04-12 ENCOUNTER — TELEPHONE (OUTPATIENT)
Dept: PRIMARY CARE CLINIC | Facility: CLINIC | Age: 43
End: 2023-04-12

## 2023-04-12 ENCOUNTER — LAB VISIT (OUTPATIENT)
Dept: LAB | Facility: HOSPITAL | Age: 43
End: 2023-04-12
Attending: FAMILY MEDICINE
Payer: COMMERCIAL

## 2023-04-12 VITALS
HEIGHT: 71 IN | DIASTOLIC BLOOD PRESSURE: 80 MMHG | HEART RATE: 99 BPM | WEIGHT: 293 LBS | OXYGEN SATURATION: 96 % | SYSTOLIC BLOOD PRESSURE: 138 MMHG | TEMPERATURE: 99 F | BODY MASS INDEX: 41.02 KG/M2

## 2023-04-12 DIAGNOSIS — E66.01 MORBID OBESITY WITH BMI OF 45.0-49.9, ADULT: Chronic | ICD-10-CM

## 2023-04-12 DIAGNOSIS — F33.0 MAJOR DEPRESSIVE DISORDER, RECURRENT, MILD: ICD-10-CM

## 2023-04-12 DIAGNOSIS — R73.03 PREDIABETES: Chronic | ICD-10-CM

## 2023-04-12 DIAGNOSIS — Z00.00 ANNUAL PHYSICAL EXAM: ICD-10-CM

## 2023-04-12 DIAGNOSIS — I10 ESSENTIAL HYPERTENSION: Chronic | ICD-10-CM

## 2023-04-12 DIAGNOSIS — Z00.00 ANNUAL PHYSICAL EXAM: Primary | ICD-10-CM

## 2023-04-12 DIAGNOSIS — F41.1 GENERALIZED ANXIETY DISORDER: ICD-10-CM

## 2023-04-12 PROBLEM — Z86.16 HISTORY OF COVID-19: Status: RESOLVED | Noted: 2021-09-16 | Resolved: 2023-04-12

## 2023-04-12 PROBLEM — R07.82 INTERCOSTAL PAIN: Status: RESOLVED | Noted: 2021-09-16 | Resolved: 2023-04-12

## 2023-04-12 PROBLEM — L20.84 INTRINSIC ECZEMA: Chronic | Status: ACTIVE | Noted: 2021-12-30

## 2023-04-12 LAB
ALBUMIN SERPL BCP-MCNC: 3.8 G/DL (ref 3.5–5.2)
ALP SERPL-CCNC: 69 U/L (ref 55–135)
ALT SERPL W/O P-5'-P-CCNC: 22 U/L (ref 10–44)
ANION GAP SERPL CALC-SCNC: 13 MMOL/L (ref 8–16)
AST SERPL-CCNC: 25 U/L (ref 10–40)
BILIRUB SERPL-MCNC: 0.3 MG/DL (ref 0.1–1)
BUN SERPL-MCNC: 20 MG/DL (ref 6–20)
CALCIUM SERPL-MCNC: 9.7 MG/DL (ref 8.7–10.5)
CHLORIDE SERPL-SCNC: 105 MMOL/L (ref 95–110)
CHOLEST SERPL-MCNC: 179 MG/DL (ref 120–199)
CHOLEST/HDLC SERPL: 4.2 {RATIO} (ref 2–5)
CO2 SERPL-SCNC: 24 MMOL/L (ref 23–29)
CREAT SERPL-MCNC: 0.9 MG/DL (ref 0.5–1.4)
ERYTHROCYTE [DISTWIDTH] IN BLOOD BY AUTOMATED COUNT: 13.7 % (ref 11.5–14.5)
EST. GFR  (NO RACE VARIABLE): >60 ML/MIN/1.73 M^2
GLUCOSE SERPL-MCNC: 75 MG/DL (ref 70–110)
HCT VFR BLD AUTO: 47.6 % (ref 37–48.5)
HDLC SERPL-MCNC: 43 MG/DL (ref 40–75)
HDLC SERPL: 24 % (ref 20–50)
HGB BLD-MCNC: 15 G/DL (ref 12–16)
LDLC SERPL CALC-MCNC: 88.8 MG/DL (ref 63–159)
MCH RBC QN AUTO: 30.5 PG (ref 27–31)
MCHC RBC AUTO-ENTMCNC: 31.5 G/DL (ref 32–36)
MCV RBC AUTO: 97 FL (ref 82–98)
NONHDLC SERPL-MCNC: 136 MG/DL
PLATELET # BLD AUTO: 363 K/UL (ref 150–450)
PMV BLD AUTO: 10.5 FL (ref 9.2–12.9)
POTASSIUM SERPL-SCNC: 4.2 MMOL/L (ref 3.5–5.1)
PROT SERPL-MCNC: 7.5 G/DL (ref 6–8.4)
RBC # BLD AUTO: 4.92 M/UL (ref 4–5.4)
SODIUM SERPL-SCNC: 142 MMOL/L (ref 136–145)
TRIGL SERPL-MCNC: 236 MG/DL (ref 30–150)
WBC # BLD AUTO: 11.41 K/UL (ref 3.9–12.7)

## 2023-04-12 PROCEDURE — 85027 COMPLETE CBC AUTOMATED: CPT | Performed by: FAMILY MEDICINE

## 2023-04-12 PROCEDURE — 99999 PR PBB SHADOW E&M-EST. PATIENT-LVL IV: ICD-10-PCS | Mod: PBBFAC,,, | Performed by: FAMILY MEDICINE

## 2023-04-12 PROCEDURE — 1159F MED LIST DOCD IN RCRD: CPT | Mod: CPTII,S$GLB,, | Performed by: FAMILY MEDICINE

## 2023-04-12 PROCEDURE — 3075F PR MOST RECENT SYSTOLIC BLOOD PRESS GE 130-139MM HG: ICD-10-PCS | Mod: CPTII,S$GLB,, | Performed by: FAMILY MEDICINE

## 2023-04-12 PROCEDURE — 80053 COMPREHEN METABOLIC PANEL: CPT | Performed by: FAMILY MEDICINE

## 2023-04-12 PROCEDURE — 3079F PR MOST RECENT DIASTOLIC BLOOD PRESSURE 80-89 MM HG: ICD-10-PCS | Mod: CPTII,S$GLB,, | Performed by: FAMILY MEDICINE

## 2023-04-12 PROCEDURE — 3075F SYST BP GE 130 - 139MM HG: CPT | Mod: CPTII,S$GLB,, | Performed by: FAMILY MEDICINE

## 2023-04-12 PROCEDURE — 1160F PR REVIEW ALL MEDS BY PRESCRIBER/CLIN PHARMACIST DOCUMENTED: ICD-10-PCS | Mod: CPTII,S$GLB,, | Performed by: FAMILY MEDICINE

## 2023-04-12 PROCEDURE — 99999 PR PBB SHADOW E&M-EST. PATIENT-LVL IV: CPT | Mod: PBBFAC,,, | Performed by: FAMILY MEDICINE

## 2023-04-12 PROCEDURE — 83036 HEMOGLOBIN GLYCOSYLATED A1C: CPT | Performed by: FAMILY MEDICINE

## 2023-04-12 PROCEDURE — 99396 PREV VISIT EST AGE 40-64: CPT | Mod: S$GLB,,, | Performed by: FAMILY MEDICINE

## 2023-04-12 PROCEDURE — 3008F BODY MASS INDEX DOCD: CPT | Mod: CPTII,S$GLB,, | Performed by: FAMILY MEDICINE

## 2023-04-12 PROCEDURE — 36415 COLL VENOUS BLD VENIPUNCTURE: CPT | Performed by: FAMILY MEDICINE

## 2023-04-12 PROCEDURE — 80061 LIPID PANEL: CPT | Performed by: FAMILY MEDICINE

## 2023-04-12 PROCEDURE — 3079F DIAST BP 80-89 MM HG: CPT | Mod: CPTII,S$GLB,, | Performed by: FAMILY MEDICINE

## 2023-04-12 PROCEDURE — 84443 ASSAY THYROID STIM HORMONE: CPT | Performed by: FAMILY MEDICINE

## 2023-04-12 PROCEDURE — 1160F RVW MEDS BY RX/DR IN RCRD: CPT | Mod: CPTII,S$GLB,, | Performed by: FAMILY MEDICINE

## 2023-04-12 PROCEDURE — 99396 PR PREVENTIVE VISIT,EST,40-64: ICD-10-PCS | Mod: S$GLB,,, | Performed by: FAMILY MEDICINE

## 2023-04-12 PROCEDURE — 3008F PR BODY MASS INDEX (BMI) DOCUMENTED: ICD-10-PCS | Mod: CPTII,S$GLB,, | Performed by: FAMILY MEDICINE

## 2023-04-12 PROCEDURE — 1159F PR MEDICATION LIST DOCUMENTED IN MEDICAL RECORD: ICD-10-PCS | Mod: CPTII,S$GLB,, | Performed by: FAMILY MEDICINE

## 2023-04-12 RX ORDER — SERTRALINE HYDROCHLORIDE 50 MG/1
50 TABLET, FILM COATED ORAL DAILY
Qty: 90 TABLET | Refills: 3 | Status: SHIPPED | OUTPATIENT
Start: 2023-04-12 | End: 2024-04-28

## 2023-04-12 RX ORDER — HYDROCHLOROTHIAZIDE 25 MG/1
25 TABLET ORAL DAILY
Qty: 90 TABLET | Refills: 3 | Status: SHIPPED | OUTPATIENT
Start: 2023-04-12 | End: 2024-04-28

## 2023-04-12 NOTE — PROGRESS NOTES
Subjective:   Patient ID: Yamila Langley is a 43 y.o. female.  Chief Complaint:  Medication Refill    Presents for overdue annual physical exam     Medical history:  -hypertension.  Controlled.  On HCTZ 25 mg daily.  Reports compliance.  Denies side effects.  No shortness breath swelling.    -prediabetes.  No medications.  Denies symptoms hypo or hyperglycemia.    -depression/anxiety.  On Zoloft 50 mg daily.  Ran out of medication and could not obtain refill without a visit.  States increased symptoms off medicine.  Was tolerating without side effects or difficulty.  Mood was stable.  Was happy with medication.  Would like to continue.    -morbid obesity.  History gastric bypass.  Reports despite improved diet, regular exercise, and other lifestyle changes inability to lose weight.  Would like referral to lifestyle and wellness clinic to consider possible medical treatment options for weight loss.    Needs gyn exam   Needs mammogram  Tetanus booster up-to-date   No previous COVID vaccines   No recent flu vaccines   Previous hepatitis-C and HIV screens negative    Review of Systems   Constitutional:  Negative for activity change, appetite change, chills, diaphoresis, fatigue and fever.   HENT:  Negative for congestion, dental problem, ear pain, postnasal drip, rhinorrhea, sinus pressure and sore throat.    Eyes:  Negative for visual disturbance.   Respiratory:  Negative for cough, chest tightness, shortness of breath and wheezing.    Cardiovascular:  Negative for chest pain, palpitations and leg swelling.   Gastrointestinal:  Negative for abdominal pain, blood in stool, constipation, diarrhea, nausea and vomiting.   Endocrine: Negative for polydipsia, polyphagia and polyuria.   Genitourinary:  Negative for difficulty urinating, dysuria, flank pain, frequency, hematuria, pelvic pain and urgency.   Musculoskeletal:  Negative for myalgias.   Skin:  Negative for rash.   Neurological:  Negative for dizziness,  "tremors, syncope, weakness, light-headedness, numbness and headaches.   Hematological:  Negative for adenopathy.   Psychiatric/Behavioral:  Positive for decreased concentration and dysphoric mood. Negative for agitation, behavioral problems, confusion, hallucinations, self-injury, sleep disturbance and suicidal ideas. The patient is nervous/anxious. The patient is not hyperactive.      Objective:   /80 (BP Location: Left arm, Patient Position: Sitting, BP Method: Large (Manual))   Pulse 99   Temp 98.5 °F (36.9 °C) (Tympanic)   Ht 5' 10.8" (1.798 m)   Wt (!) 160.3 kg (353 lb 6.4 oz)   SpO2 96%   BMI 49.57 kg/m²     Physical Exam  Vitals and nursing note reviewed.   Constitutional:       Appearance: Normal appearance. She is well-developed. She is morbidly obese.   HENT:      Right Ear: Hearing, tympanic membrane, ear canal and external ear normal.      Left Ear: Hearing, tympanic membrane, ear canal and external ear normal.      Nose: Nose normal. No mucosal edema, congestion or rhinorrhea.      Right Turbinates: Not enlarged or swollen.      Left Turbinates: Not enlarged or swollen.      Mouth/Throat:      Mouth: No oral lesions.      Pharynx: Oropharynx is clear. Uvula midline. No pharyngeal swelling, oropharyngeal exudate or posterior oropharyngeal erythema.   Eyes:      General: Lids are normal. No scleral icterus.        Right eye: No discharge.         Left eye: No discharge.      Conjunctiva/sclera: Conjunctivae normal.      Right eye: Right conjunctiva is not injected. No exudate.     Left eye: Left conjunctiva is not injected. No exudate.  Neck:      Thyroid: No thyroid mass, thyromegaly or thyroid tenderness.      Vascular: No JVD.   Cardiovascular:      Rate and Rhythm: Normal rate and regular rhythm.      Heart sounds: Normal heart sounds. No murmur heard.    No friction rub. No gallop.   Pulmonary:      Effort: Pulmonary effort is normal.      Breath sounds: Normal breath sounds. No wheezing, " rhonchi or rales.   Abdominal:      General: There is no distension.      Palpations: Abdomen is soft.      Tenderness: There is no abdominal tenderness.   Musculoskeletal:      Right lower leg: No edema.      Left lower leg: No edema.   Lymphadenopathy:      Cervical: No cervical adenopathy.   Skin:     Findings: No rash.   Neurological:      General: No focal deficit present.      Mental Status: She is alert and oriented to person, place, and time.   Psychiatric:         Mood and Affect: Mood and affect normal.         Behavior: Behavior is cooperative.     Assessment:       ICD-10-CM ICD-9-CM   1. Annual physical exam  Z00.00 V70.0   2. Essential hypertension  I10 401.9   3. Prediabetes  R73.03 790.29   4. Morbid obesity with BMI of 45.0-49.9, adult  E66.01 278.01    Z68.42 V85.42   5. Generalized anxiety disorder  F41.1 300.02   6. Major depressive disorder, recurrent, mild  F33.0 296.31     Plan:   Annual physical exam  -     CBC Without Differential; Future; Expected date: 04/12/2023  -     Comprehensive Metabolic Panel; Future; Expected date: 04/12/2023  -     Lipid Panel; Future; Expected date: 04/12/2023  -     TSH; Future; Expected date: 04/12/2023  -     Hemoglobin A1C; Future; Expected date: 04/12/2023  Blood pressure normal.  BMI 49.6.  Remainder exam stable.    Check labs.  Treat as indicated.    Needs to schedule gyn exam   Needs schedule mammogram  Colon cancer screening at 45 years old  Tetanus booster up-to-date  Declines COVID vaccine  Declines flu vaccine    Essential hypertension  -     hydroCHLOROthiazide (HYDRODIURIL) 25 MG tablet; Take 1 tablet (25 mg total) by mouth once daily.  Dispense: 90 tablet; Refill: 3  Controlled.  Stable.  Asymptomatic.  BP at goal.    Continue current medication     Prediabetes  -     Hemoglobin A1C; Future; Expected date: 04/12/2023  Asymptomatic.    If A1c less than 6.5%, okay remain off medication  If A1c greater than 6.5%, will need to start  medication    Morbid obesity with BMI of 45.0-49.9, adult  -     Ambulatory referral/consult to [facility]; Future; Expected date: 04/19/2023    Generalized anxiety disorder  Major depressive disorder, recurrent, mild  -     sertraline (ZOLOFT) 50 MG tablet; Take 1 tablet (50 mg total) by mouth once daily.  Dispense: 90 tablet; Refill: 3  Increased symptoms off medication   On medication symptoms well controlled without any significant side effects   Restart Zoloft 50 mg daily     Return to clinic 1 year for annual physical exam or sooner if needed

## 2023-04-13 LAB
ESTIMATED AVG GLUCOSE: 111 MG/DL (ref 68–131)
HBA1C MFR BLD: 5.5 % (ref 4–5.6)
TSH SERPL DL<=0.005 MIU/L-ACNC: 0.88 UIU/ML (ref 0.4–4)

## 2023-04-19 ENCOUNTER — PATIENT MESSAGE (OUTPATIENT)
Dept: ADMINISTRATIVE | Facility: HOSPITAL | Age: 43
End: 2023-04-19

## 2023-05-22 ENCOUNTER — HOSPITAL ENCOUNTER (OUTPATIENT)
Dept: RADIOLOGY | Facility: HOSPITAL | Age: 43
Discharge: HOME | End: 2023-05-22
Attending: FAMILY MEDICINE
Payer: COMMERCIAL

## 2023-05-22 VITALS — HEIGHT: 70 IN | WEIGHT: 293 LBS | BODY MASS INDEX: 41.95 KG/M2

## 2023-05-22 DIAGNOSIS — Z12.31 OTHER SCREENING MAMMOGRAM: ICD-10-CM

## 2023-05-22 PROCEDURE — 77067 MAMMO DIGITAL SCREENING BILAT WITH TOMO: ICD-10-PCS | Mod: 26,,, | Performed by: RADIOLOGY

## 2023-05-22 PROCEDURE — 77063 BREAST TOMOSYNTHESIS BI: CPT | Mod: 26,,, | Performed by: RADIOLOGY

## 2023-05-22 PROCEDURE — 77067 SCR MAMMO BI INCL CAD: CPT | Mod: 26,,, | Performed by: RADIOLOGY

## 2023-05-22 PROCEDURE — 77067 SCR MAMMO BI INCL CAD: CPT | Mod: TC

## 2023-05-22 PROCEDURE — 77063 MAMMO DIGITAL SCREENING BILAT WITH TOMO: ICD-10-PCS | Mod: 26,,, | Performed by: RADIOLOGY

## 2023-06-13 ENCOUNTER — OFFICE VISIT (OUTPATIENT)
Dept: DERMATOLOGY | Facility: CLINIC | Age: 43
End: 2023-06-13
Payer: COMMERCIAL

## 2023-06-13 DIAGNOSIS — D22.9 MULTIPLE BENIGN NEVI: Primary | ICD-10-CM

## 2023-06-13 DIAGNOSIS — L82.1 SEBORRHEIC KERATOSIS: ICD-10-CM

## 2023-06-13 DIAGNOSIS — D18.00 HEMANGIOMA, UNSPECIFIED SITE: ICD-10-CM

## 2023-06-13 DIAGNOSIS — D22.39 FIBROUS PAPULE OF NOSE: ICD-10-CM

## 2023-06-13 PROCEDURE — 99999 PR PBB SHADOW E&M-EST. PATIENT-LVL III: CPT | Mod: PBBFAC,,, | Performed by: STUDENT IN AN ORGANIZED HEALTH CARE EDUCATION/TRAINING PROGRAM

## 2023-06-13 PROCEDURE — 1160F RVW MEDS BY RX/DR IN RCRD: CPT | Mod: CPTII,S$GLB,, | Performed by: STUDENT IN AN ORGANIZED HEALTH CARE EDUCATION/TRAINING PROGRAM

## 2023-06-13 PROCEDURE — 3044F PR MOST RECENT HEMOGLOBIN A1C LEVEL <7.0%: ICD-10-PCS | Mod: CPTII,S$GLB,, | Performed by: STUDENT IN AN ORGANIZED HEALTH CARE EDUCATION/TRAINING PROGRAM

## 2023-06-13 PROCEDURE — 3044F HG A1C LEVEL LT 7.0%: CPT | Mod: CPTII,S$GLB,, | Performed by: STUDENT IN AN ORGANIZED HEALTH CARE EDUCATION/TRAINING PROGRAM

## 2023-06-13 PROCEDURE — 99202 OFFICE O/P NEW SF 15 MIN: CPT | Mod: S$GLB,,, | Performed by: STUDENT IN AN ORGANIZED HEALTH CARE EDUCATION/TRAINING PROGRAM

## 2023-06-13 PROCEDURE — 1159F PR MEDICATION LIST DOCUMENTED IN MEDICAL RECORD: ICD-10-PCS | Mod: CPTII,S$GLB,, | Performed by: STUDENT IN AN ORGANIZED HEALTH CARE EDUCATION/TRAINING PROGRAM

## 2023-06-13 PROCEDURE — 1160F PR REVIEW ALL MEDS BY PRESCRIBER/CLIN PHARMACIST DOCUMENTED: ICD-10-PCS | Mod: CPTII,S$GLB,, | Performed by: STUDENT IN AN ORGANIZED HEALTH CARE EDUCATION/TRAINING PROGRAM

## 2023-06-13 PROCEDURE — 1159F MED LIST DOCD IN RCRD: CPT | Mod: CPTII,S$GLB,, | Performed by: STUDENT IN AN ORGANIZED HEALTH CARE EDUCATION/TRAINING PROGRAM

## 2023-06-13 PROCEDURE — 99202 PR OFFICE/OUTPT VISIT, NEW, LEVL II, 15-29 MIN: ICD-10-PCS | Mod: S$GLB,,, | Performed by: STUDENT IN AN ORGANIZED HEALTH CARE EDUCATION/TRAINING PROGRAM

## 2023-06-13 PROCEDURE — 99999 PR PBB SHADOW E&M-EST. PATIENT-LVL III: ICD-10-PCS | Mod: PBBFAC,,, | Performed by: STUDENT IN AN ORGANIZED HEALTH CARE EDUCATION/TRAINING PROGRAM

## 2023-06-13 NOTE — PROGRESS NOTES
Patient Information  Name: Yamila Langley  : 1980  MRN: [id number]     Referring Physician:  Dr. Dela Cruz   Primary Care Physician:  Dr. Alvaro Zurita MD   Date of Visit: 2023      Subjective:       Yamila Langley is a 43 y.o. female who presents for   Chief Complaint   Patient presents with    Spot     C/o spots on face and right leg      Patient with new complaint of lesion(s)  Location: face, right leg  Duration: years  Symptoms: none  Relieving factors/Previous treatments: none    Patient was last seen:Visit date not found     Prior notes by myself reviewed.   Clinical documentation obtained by nursing staff reviewed.    Review of Systems   Skin:  Negative for itching and rash.      Objective:    Physical Exam   Constitutional: She appears well-developed and well-nourished. No distress.   Neurological: She is alert and oriented to person, place, and time. She is not disoriented.   Psychiatric: She has a normal mood and affect.   Skin:   Areas Examined (abnormalities noted in diagram):   Head / Face Inspection Performed  RLE Inspected                 Diagram Legend     Erythematous scaling macule/papule c/w actinic keratosis       Vascular papule c/w angioma      Pigmented verrucoid papule/plaque c/w seborrheic keratosis      Yellow umbilicated papule c/w sebaceous hyperplasia      Irregularly shaped tan macule c/w lentigo     1-2 mm smooth white papules consistent with Milia      Movable subcutaneous cyst with punctum c/w epidermal inclusion cyst      Subcutaneous movable cyst c/w pilar cyst      Firm pink to brown papule c/w dermatofibroma      Pedunculated fleshy papule(s) c/w skin tag(s)      Evenly pigmented macule c/w junctional nevus     Mildly variegated pigmented, slightly irregular-bordered macule c/w mildly atypical nevus      Flesh colored to evenly pigmented papule c/w intradermal nevus       Pink pearly papule/plaque c/w basal cell carcinoma      Erythematous  hyperkeratotic cursted plaque c/w SCC      Surgical scar with no sign of skin cancer recurrence      Open and closed comedones      Inflammatory papules and pustules      Verrucoid papule consistent consistent with wart     Erythematous eczematous patches and plaques     Dystrophic onycholytic nail with subungual debris c/w onychomycosis     Umbilicated papule    Erythematous-base heme-crusted tan verrucoid plaque consistent with inflamed seborrheic keratosis     Erythematous Silvery Scaling Plaque c/w Psoriasis     See annotation      No images are attached to the encounter or orders placed in the encounter.    [] Data reviewed  [] Independent review of test  [] Management discussed with another provider    Assessment / Plan:        Multiple benign nevi  Discussed ABCDE's of nevi.  Monitor for new mole or moles that are becoming bigger, darker, irritated, or developing irregular borders. Brochure provided. Instructed patient to observe lesion(s) for changes and follow up in clinic if changes are noted. Patient to monitor skin at home for new or changing lesions.     Seborrheic keratosis  These are benign inherited growths without a malignant potential. Reassurance given to patient. No treatment is necessary.     Hemangioma, unspecified site  This is a benign vascular lesion. Reassurance given. No treatment required.     Fibrous papule of nose   - minor problem and chronic.   Reassurance given to patient. No treatment necessary.              LOS NUMBER AND COMPLEXITY OF PROBLEMS    COMPLEXITY OF DATA RISK TOTAL TIME (m)   14381  [id number] [] 1 self-limited or minor problem [x] Minimal to none [x] No treatment recommended or patient to monitor 15-29  10-19   33409  84018 Low  [] 2 or > self limited or minor problems  [] 1 stable chronic illness  [] 1 acute, uncomplicated illness or injury Limited (2)  [] Prior external notes from each unique source  [] Review result of each unique test  [] Order each unique test []   Low  OTC medications, minor skin biopsy 30-44  20-29   90599  35206 Moderate  []  1 or > chronic illness with progression, exacerbation or SE of treatment  [x]  2 or more stable chronic illnesses  []  1 acute illness with systemic symptoms  []  1 acute complicated injury  []  1 undiagnosed new problem with uncertain prognosis Moderate (1/3 below)  []  3 or more data items        *Now includes assessment requiring independent historian  []  Independent interpretation of a test  []  Discuss management/test with another provider Moderate  []  Prescription drug mgmt  []  Minor surgery with risk discussed  []  Mgmt limited by social determinates 45-59  30-39   21427  17599 High  []  1 or more chronic illness with severe exacerbation, progression or SE of treatment  []  1 acute or chronic illness/injury that poses a threat to life or bodily function Extensive (2/3 below)  []  3 or more data items        *Now includes assessment requiring independent historian.  []  Independent interpretation of a test  []  Discuss management/test with another provider High  []  Major surgery with risk discussed  []  Drug therapy requiring intensive monitoring for toxicity  []  Hospitalization  []  Decision for DNR 60-74  40-54      No follow-ups on file.    Ammy Zarate MD, FAAD  [facility]

## 2023-09-14 ENCOUNTER — OFFICE VISIT (OUTPATIENT)
Dept: URGENT CARE | Facility: CLINIC | Age: 43
End: 2023-09-14
Payer: COMMERCIAL

## 2023-09-14 VITALS
WEIGHT: 293 LBS | DIASTOLIC BLOOD PRESSURE: 71 MMHG | HEIGHT: 67 IN | OXYGEN SATURATION: 97 % | SYSTOLIC BLOOD PRESSURE: 145 MMHG | HEART RATE: 109 BPM | BODY MASS INDEX: 45.99 KG/M2

## 2023-09-14 DIAGNOSIS — R10.2 VAGINAL PAIN: ICD-10-CM

## 2023-09-14 DIAGNOSIS — L03.818 CELLULITIS OF OTHER SPECIFIED SITE: ICD-10-CM

## 2023-09-14 DIAGNOSIS — N76.0 ABSCESS, VAGINA: Primary | ICD-10-CM

## 2023-09-14 PROCEDURE — 99213 OFFICE O/P EST LOW 20 MIN: CPT | Mod: S$GLB,,, | Performed by: NURSE PRACTITIONER

## 2023-09-14 PROCEDURE — 99213 PR OFFICE/OUTPT VISIT, EST, LEVL III, 20-29 MIN: ICD-10-PCS | Mod: S$GLB,,, | Performed by: NURSE PRACTITIONER

## 2023-09-14 RX ORDER — TRAMADOL HYDROCHLORIDE 50 MG/1
50 TABLET ORAL
Qty: 6 TABLET | Refills: 0 | Status: SHIPPED | OUTPATIENT
Start: 2023-09-14 | End: 2023-09-20

## 2023-09-14 RX ORDER — SULFAMETHOXAZOLE AND TRIMETHOPRIM 800; 160 MG/1; MG/1
1 TABLET ORAL 2 TIMES DAILY
Qty: 14 TABLET | Refills: 0 | Status: SHIPPED | OUTPATIENT
Start: 2023-09-14 | End: 2023-09-26

## 2023-09-14 RX ORDER — MUPIROCIN 20 MG/G
OINTMENT TOPICAL 2 TIMES DAILY
Qty: 22 G | Refills: 0 | Status: SHIPPED | OUTPATIENT
Start: 2023-09-14 | End: 2023-09-26

## 2023-09-14 NOTE — PROGRESS NOTES
"Subjective:      Patient ID: Yamila Langley is a 43 y.o. female.    Vitals:  height is 5' 7.36" (1.711 m) (pended) and weight is 163.9 kg (361 lb 3.6 oz) (abnormal, pended). Her tympanic temperature is 99.6 °F (37.6 °C) (pended). Her blood pressure is 145/71 (abnormal) and her pulse is 109. Her respiration is 16 (pended) and oxygen saturation is 97%.     Chief Complaint: Cyst    Yamila Langley is a 42 y/o female whom  presents to clinic today  with c/o a "bump" on her  vaginal area. Patient states on Sunday she noticed a little knot on her vagina. The area has been getting progressively worse since then.Patient states she does have a history of cyst and cellulitis which is usually responsive to oral antibiotics.  Patient has taken Aleve and applied warm compresses to help with symptoms.Patient states anything that touches the area causes extreme pain.     Cyst  This is a new problem. The current episode started in the past 7 days. The problem has been rapidly worsening. Associated symptoms include fatigue and a fever. Pertinent negatives include no abdominal pain, anorexia, arthralgias, change in bowel habit, chest pain, chills, congestion, coughing, diaphoresis, headaches, joint swelling, myalgias, nausea, neck pain, numbness, rash, sore throat, swollen glands, urinary symptoms, vertigo, visual change, vomiting or weakness. The symptoms are aggravated by standing and walking (touching area). She has tried NSAIDs for the symptoms. The treatment provided mild relief.       Constitution: Positive for fatigue and fever. Negative for chills and sweating.   HENT:  Negative for congestion and sore throat.    Neck: Negative for neck pain.   Cardiovascular:  Negative for chest pain.   Respiratory:  Negative for cough.    Gastrointestinal:  Negative for abdominal pain, nausea and vomiting.   Musculoskeletal:  Negative for joint pain, joint swelling and muscle ache.   Skin:  Negative for rash.   Neurological:  " Negative for history of vertigo, headaches and numbness.      Objective:     Physical Exam   Constitutional: She does not appear ill. No distress.   HENT:   Head: Normocephalic and atraumatic.   Nose: Nose normal.   Eyes: Conjunctivae are normal. Pupils are equal, round, and reactive to light. Extraocular movement intact   Neck: Neck supple.   Cardiovascular: Regular rhythm, normal heart sounds and normal pulses. Tachycardia present.   Pulmonary/Chest: Effort normal and breath sounds normal.   Genitourinary:         Comments: 1x2cm Right vulvar abscess with surrounding cellulitis noted area is firm, without fluctuance.      Neurological: no focal deficit.   Skin: Capillary refill takes less than 2 seconds.   Nursing note and vitals reviewed.chaperone present (Sabiha SANCHEZ MA)         Assessment:     1. Abscess, vagina    2. Vaginal pain    3. Cellulitis of other specified site        Plan:       Abscess, vagina  -     sulfamethoxazole-trimethoprim 800-160mg (BACTRIM DS) 800-160 mg Tab; Take 1 tablet by mouth 2 (two) times daily. for 7 days  Dispense: 14 tablet; Refill: 0  -     mupirocin (BACTROBAN) 2 % ointment; by Nasal route 2 (two) times daily. for 7 days  Dispense: 30 g; Refill: 0    Vaginal pain  -     traMADoL (ULTRAM) 50 mg tablet; Take 1 tablet (50 mg total) by mouth every 24 hours as needed for Pain (use especially at bedtime as needed.).  Dispense: 6 tablet; Refill: 0    Cellulitis of other specified site  -     sulfamethoxazole-trimethoprim 800-160mg (BACTRIM DS) 800-160 mg Tab; Take 1 tablet by mouth 2 (two) times daily. for 7 days  Dispense: 14 tablet; Refill: 0          Medical Decision Making:   Urgent Care Management:  Previous encounters  were independently reviewed. Discussed with patient  all pertinent information and results. Discussed patient diagnosis and plan of treatment options. Patient would like to treat with antibiotics and symptomatic treatment.  Additional plan of care as outlined above.  Patient  was given all follow up and return instructions. All questions and concerns were addressed at this time. Patient  expresses understanding of information and instructions, and is comfortable with plan.    Patient was instructed to follow up with his Primary Care Provider if no improvement in symptoms in 2-3 DAYS:  or go to ED immediately for any worsening or change in current symptoms. Patient verbalized understanding and agrees with the discussed plan of care. Patient remained stable throughout the visit and exited the exam room in NAD.           Patient Instructions   Abscess Discharge   soak affected area in warm water with epsom salts and apply warm compresses.    Wash your wound with soap and water before and after touching your abscess.  Please take your antibiotics to completion.   Please start an OTC probiotic while taking antibiotics to replenish GI anna affected by antibiotic use.   If you were prescribed a narcotic medication, do not drive or operate heavy equipment or machinery while taking these medications.  Please return here in 2-3 days for a recheck of your abscess if no improvement is noted.   If not allergic, please take over the counter Tylenol (Acetaminophen) and/or Motrin (Ibuprofen) as directed for control of pain and/or fever.  Please follow up with your primary care doctor or specialist as needed.   Please report to the nearest ER with new/ worsening symptoms including fever, body aches, worsening pain  or chills.

## 2023-09-14 NOTE — PATIENT INSTRUCTIONS
Abscess Discharge   soak affected area in warm water with epsom salts and apply warm compresses.    Wash your wound with soap and water before and after touching your abscess.  Please take your antibiotics to completion.   Please start an OTC probiotic while taking antibiotics to replenish GI anna affected by antibiotic use.   If you were prescribed a narcotic medication, do not drive or operate heavy equipment or machinery while taking these medications.  Please return here in 2-3 days for a recheck of your abscess if no improvement is noted.   If not allergic, please take over the counter Tylenol (Acetaminophen) and/or Motrin (Ibuprofen) as directed for control of pain and/or fever.  Please follow up with your primary care doctor or specialist as needed.   Please report to the nearest ER with new/ worsening symptoms including fever, body aches, worsening pain  or chills.

## 2023-09-17 ENCOUNTER — TELEPHONE (OUTPATIENT)
Dept: URGENT CARE | Facility: CLINIC | Age: 43
End: 2023-09-17
Payer: COMMERCIAL

## 2023-09-18 ENCOUNTER — OFFICE VISIT (OUTPATIENT)
Dept: INTERNAL MEDICINE | Facility: CLINIC | Age: 43
End: 2023-09-18
Payer: COMMERCIAL

## 2023-09-18 ENCOUNTER — LAB VISIT (OUTPATIENT)
Dept: LAB | Facility: HOSPITAL | Age: 43
End: 2023-09-18
Payer: COMMERCIAL

## 2023-09-18 VITALS
BODY MASS INDEX: 45.99 KG/M2 | OXYGEN SATURATION: 95 % | HEIGHT: 67 IN | WEIGHT: 293 LBS | DIASTOLIC BLOOD PRESSURE: 82 MMHG | SYSTOLIC BLOOD PRESSURE: 138 MMHG | TEMPERATURE: 102 F | RESPIRATION RATE: 18 BRPM | HEART RATE: 99 BPM

## 2023-09-18 DIAGNOSIS — R50.9 FEVER, UNSPECIFIED FEVER CAUSE: Primary | ICD-10-CM

## 2023-09-18 DIAGNOSIS — R52 BODY ACHES: ICD-10-CM

## 2023-09-18 DIAGNOSIS — R60.0 PEDAL EDEMA: ICD-10-CM

## 2023-09-18 DIAGNOSIS — I10 ESSENTIAL HYPERTENSION: Chronic | ICD-10-CM

## 2023-09-18 DIAGNOSIS — R50.9 FEVER, UNSPECIFIED FEVER CAUSE: ICD-10-CM

## 2023-09-18 DIAGNOSIS — E66.01 MORBID OBESITY WITH BMI OF 50.0-59.9, ADULT: ICD-10-CM

## 2023-09-18 DIAGNOSIS — R00.2 HEART PALPITATIONS: ICD-10-CM

## 2023-09-18 DIAGNOSIS — F41.1 GAD (GENERALIZED ANXIETY DISORDER): ICD-10-CM

## 2023-09-18 LAB
BACTERIA #/AREA URNS HPF: ABNORMAL /HPF
BILIRUB UR QL STRIP: ABNORMAL
CLARITY UR: CLEAR
COLOR UR: ABNORMAL
CTP QC/QA: YES
CTP QC/QA: YES
GLUCOSE UR QL STRIP: NEGATIVE
HGB UR QL STRIP: ABNORMAL
KETONES UR QL STRIP: NEGATIVE
LEUKOCYTE ESTERASE UR QL STRIP: NEGATIVE
MICROSCOPIC COMMENT: ABNORMAL
NITRITE UR QL STRIP: NEGATIVE
OTHER ELEMENTS URNS MICRO: ABNORMAL
PH UR STRIP: 7 [PH] (ref 5–8)
POC MOLECULAR INFLUENZA A AGN: NEGATIVE
POC MOLECULAR INFLUENZA B AGN: NEGATIVE
PROT UR QL STRIP: NEGATIVE
RBC #/AREA URNS HPF: 18 /HPF (ref 0–4)
SARS-COV-2 RDRP RESP QL NAA+PROBE: NEGATIVE
SP GR UR STRIP: 1.02 (ref 1–1.03)
SQUAMOUS #/AREA URNS HPF: 5 /HPF
URN SPEC COLLECT METH UR: ABNORMAL
UROBILINOGEN UR STRIP-ACNC: >=8 MG/DL
WBC #/AREA URNS HPF: 1 /HPF (ref 0–5)

## 2023-09-18 PROCEDURE — 3044F PR MOST RECENT HEMOGLOBIN A1C LEVEL <7.0%: ICD-10-PCS | Mod: CPTII,S$GLB,, | Performed by: FAMILY MEDICINE

## 2023-09-18 PROCEDURE — 3008F BODY MASS INDEX DOCD: CPT | Mod: CPTII,S$GLB,, | Performed by: FAMILY MEDICINE

## 2023-09-18 PROCEDURE — 3008F PR BODY MASS INDEX (BMI) DOCUMENTED: ICD-10-PCS | Mod: CPTII,S$GLB,, | Performed by: FAMILY MEDICINE

## 2023-09-18 PROCEDURE — 87635: ICD-10-PCS | Mod: QW,S$GLB,, | Performed by: FAMILY MEDICINE

## 2023-09-18 PROCEDURE — 87635 SARS-COV-2 COVID-19 AMP PRB: CPT | Mod: QW,S$GLB,, | Performed by: FAMILY MEDICINE

## 2023-09-18 PROCEDURE — 1160F RVW MEDS BY RX/DR IN RCRD: CPT | Mod: CPTII,S$GLB,, | Performed by: FAMILY MEDICINE

## 2023-09-18 PROCEDURE — 99999 PR PBB SHADOW E&M-EST. PATIENT-LVL IV: ICD-10-PCS | Mod: PBBFAC,,, | Performed by: FAMILY MEDICINE

## 2023-09-18 PROCEDURE — 99214 OFFICE O/P EST MOD 30 MIN: CPT | Mod: S$GLB,,, | Performed by: FAMILY MEDICINE

## 2023-09-18 PROCEDURE — 99214 PR OFFICE/OUTPT VISIT, EST, LEVL IV, 30-39 MIN: ICD-10-PCS | Mod: S$GLB,,, | Performed by: FAMILY MEDICINE

## 2023-09-18 PROCEDURE — 81000 URINALYSIS NONAUTO W/SCOPE: CPT | Performed by: FAMILY MEDICINE

## 2023-09-18 PROCEDURE — 87502 INFLUENZA DNA AMP PROBE: CPT | Mod: QW,S$GLB,, | Performed by: FAMILY MEDICINE

## 2023-09-18 PROCEDURE — 1159F MED LIST DOCD IN RCRD: CPT | Mod: CPTII,S$GLB,, | Performed by: FAMILY MEDICINE

## 2023-09-18 PROCEDURE — 3079F PR MOST RECENT DIASTOLIC BLOOD PRESSURE 80-89 MM HG: ICD-10-PCS | Mod: CPTII,S$GLB,, | Performed by: FAMILY MEDICINE

## 2023-09-18 PROCEDURE — 3044F HG A1C LEVEL LT 7.0%: CPT | Mod: CPTII,S$GLB,, | Performed by: FAMILY MEDICINE

## 2023-09-18 PROCEDURE — 87502 POCT INFLUENZA A/B MOLECULAR: ICD-10-PCS | Mod: QW,S$GLB,, | Performed by: FAMILY MEDICINE

## 2023-09-18 PROCEDURE — 99999 PR PBB SHADOW E&M-EST. PATIENT-LVL IV: CPT | Mod: PBBFAC,,, | Performed by: FAMILY MEDICINE

## 2023-09-18 PROCEDURE — 1160F PR REVIEW ALL MEDS BY PRESCRIBER/CLIN PHARMACIST DOCUMENTED: ICD-10-PCS | Mod: CPTII,S$GLB,, | Performed by: FAMILY MEDICINE

## 2023-09-18 PROCEDURE — 3075F SYST BP GE 130 - 139MM HG: CPT | Mod: CPTII,S$GLB,, | Performed by: FAMILY MEDICINE

## 2023-09-18 PROCEDURE — 1159F PR MEDICATION LIST DOCUMENTED IN MEDICAL RECORD: ICD-10-PCS | Mod: CPTII,S$GLB,, | Performed by: FAMILY MEDICINE

## 2023-09-18 PROCEDURE — 3075F PR MOST RECENT SYSTOLIC BLOOD PRESS GE 130-139MM HG: ICD-10-PCS | Mod: CPTII,S$GLB,, | Performed by: FAMILY MEDICINE

## 2023-09-18 PROCEDURE — 3079F DIAST BP 80-89 MM HG: CPT | Mod: CPTII,S$GLB,, | Performed by: FAMILY MEDICINE

## 2023-09-18 RX ORDER — TRAZODONE HYDROCHLORIDE 50 MG/1
50 TABLET ORAL NIGHTLY PRN
Qty: 30 TABLET | Refills: 0 | Status: SHIPPED | OUTPATIENT
Start: 2023-09-18 | End: 2023-09-26

## 2023-09-18 NOTE — PROGRESS NOTES
"Subjective:       Patient ID: Yamila Langley is a 43 y.o. female.    Chief Complaint: Follow-up (Urgent Care), Fever, Generalized Body Aches, Edema, and Tachycardia    43-year-old female patient accompanied by her , patient Dr. Zurita with Patient Active Problem List:     H/O gastric bypass     Prediabetes     Essential hypertension     Intrinsic eczema     Generalized anxiety disorder     Major depressive disorder, recurrent, mild     Morbid obesity with BMI of 50.0-59.9, adult  Reports that she went to urgent care last week and was diagnosed with abscess to the vaginal area and has been treated with Bactrim, reports that every time she is been taking antibiotics has been making her feel a been sick, has been having heart palpitations body aches and swelling to the legs.   Patient did not realize that she is been running a fever of 102 today  Took 5 days of antibiotics so far and reports that the vaginal abscess has decreased significantly  Patient has been taking Zoloft for underlying anxiety and depression  Reports that she is been stressful about her father's health lately      Review of Systems   Constitutional:  Positive for fever. Negative for chills and fatigue.   Eyes:  Negative for visual disturbance.   Respiratory:  Positive for chest tightness. Negative for shortness of breath.    Cardiovascular:  Positive for palpitations. Negative for chest pain and leg swelling.   Gastrointestinal:  Negative for abdominal pain, nausea and vomiting.   Musculoskeletal:  Negative for myalgias.   Skin:  Negative for rash.   Neurological:  Negative for light-headedness and headaches.   Psychiatric/Behavioral:  Positive for dysphoric mood. Negative for sleep disturbance. The patient is nervous/anxious.          /82 (BP Location: Right arm, Patient Position: Sitting, BP Method: Large (Manual))   Pulse 99   Temp (!) 102.3 °F (39.1 °C) (Tympanic)   Resp 18   Ht 5' 7" (1.702 m)   Wt (!) 163.7 kg (360 " lb 14.3 oz)   LMP 09/14/2023 (Exact Date)   SpO2 95%   BMI 56.52 kg/m²   Objective:      Physical Exam  Constitutional:       Appearance: She is well-developed.   HENT:      Head: Normocephalic and atraumatic.   Cardiovascular:      Rate and Rhythm: Normal rate and regular rhythm.      Heart sounds: Normal heart sounds. No murmur heard.  Pulmonary:      Effort: Pulmonary effort is normal.      Breath sounds: Normal breath sounds. No wheezing.   Chest:      Chest wall: No tenderness.   Abdominal:      General: Bowel sounds are normal.      Palpations: Abdomen is soft.      Tenderness: There is no abdominal tenderness.   Skin:     General: Skin is warm and dry.      Findings: No rash.   Neurological:      Mental Status: She is alert and oriented to person, place, and time.   Psychiatric:      Comments: Patient has been tearful today           Assessment/Plan:   1. Fever, unspecified fever cause  -     POCT COVID-19 Rapid Screening  -     POCT Influenza A/B Molecular  -     CBC Auto Differential; Future; Expected date: 09/18/2023  -     Comprehensive Metabolic Panel; Future; Expected date: 09/18/2023  -     TSH; Future; Expected date: 09/18/2023  -     EKG 12-lead; Future  -     Urinalysis, Reflex to Urine Culture Urine, Clean Catch; Future; Expected date: 09/18/2023  2. Body aches  -     POCT COVID-19 Rapid Screening  -     POCT Influenza A/B Molecular    Secondary to the weight symptoms and with fever of 102 flu and COVID test is negative  Will check further labs to rule out any underlying acute bacterial infection-patient currently on Bactrim    3. Heart palpitations  -     EKG 12-lead; Future  -     traZODone (DESYREL) 50 MG tablet; Take 1 tablet (50 mg total) by mouth nightly as needed for Insomnia.  Dispense: 30 tablet; Refill: 0  Will get EKG for baseline but encouraged avoid stress  Trazodone prescribed today to be taken as needed for anxiety and sleep    4. Pedal edema  -     PRIETO; Future; Expected date:  09/18/2023  Will check further labs but patient was encouraged to discontinue Bactrim at this time as patient already finished 5 days of antibiotics for vaginal abscess which has improved-if any recurrence patient to let us know    5. CHAN (generalized anxiety disorder)  Continue Zoloft 50 mg daily and take trazodone as needed  If symptoms continue to persist discuss further with PCP/Psychiatry    6. Essential hypertension  Blood pressure is stable currently on hydrochlorothiazide 25 mg daily    7. Morbid obesity with BMI of 50.0-59.9, adult  Lifestyle modifications discussed

## 2023-09-19 ENCOUNTER — HOSPITAL ENCOUNTER (OUTPATIENT)
Dept: CARDIOLOGY | Facility: HOSPITAL | Age: 43
Discharge: HOME | End: 2023-09-19
Payer: COMMERCIAL

## 2023-09-19 ENCOUNTER — PATIENT MESSAGE (OUTPATIENT)
Dept: INTERNAL MEDICINE | Facility: CLINIC | Age: 43
End: 2023-09-19
Payer: COMMERCIAL

## 2023-09-19 DIAGNOSIS — R31.29 OTHER MICROSCOPIC HEMATURIA: ICD-10-CM

## 2023-09-19 DIAGNOSIS — R50.9 FEVER, UNSPECIFIED FEVER CAUSE: ICD-10-CM

## 2023-09-19 DIAGNOSIS — R79.89 ELEVATED LFTS: Primary | ICD-10-CM

## 2023-09-19 DIAGNOSIS — R00.2 HEART PALPITATIONS: ICD-10-CM

## 2023-09-19 PROCEDURE — 93005 ELECTROCARDIOGRAM TRACING: CPT

## 2023-09-19 PROCEDURE — 93010 EKG 12-LEAD: ICD-10-PCS | Mod: ,,, | Performed by: INTERNAL MEDICINE

## 2023-09-19 PROCEDURE — 93010 ELECTROCARDIOGRAM REPORT: CPT | Mod: ,,, | Performed by: INTERNAL MEDICINE

## 2023-09-20 ENCOUNTER — PATIENT MESSAGE (OUTPATIENT)
Dept: INTERNAL MEDICINE | Facility: CLINIC | Age: 43
End: 2023-09-20
Payer: COMMERCIAL

## 2023-09-22 ENCOUNTER — LAB VISIT (OUTPATIENT)
Dept: LAB | Facility: HOSPITAL | Age: 43
End: 2023-09-22
Attending: FAMILY MEDICINE
Payer: COMMERCIAL

## 2023-09-22 ENCOUNTER — LAB VISIT (OUTPATIENT)
Dept: LAB | Facility: HOSPITAL | Age: 43
End: 2023-09-22
Payer: COMMERCIAL

## 2023-09-22 DIAGNOSIS — R31.29 OTHER MICROSCOPIC HEMATURIA: ICD-10-CM

## 2023-09-22 DIAGNOSIS — R79.89 ELEVATED LFTS: ICD-10-CM

## 2023-09-22 LAB
BILIRUB UR QL STRIP: NEGATIVE
CLARITY UR: CLEAR
COLOR UR: YELLOW
GLUCOSE UR QL STRIP: NEGATIVE
HGB UR QL STRIP: NEGATIVE
KETONES UR QL STRIP: NEGATIVE
LEUKOCYTE ESTERASE UR QL STRIP: NEGATIVE
NITRITE UR QL STRIP: NEGATIVE
PH UR STRIP: 6 [PH] (ref 5–8)
PROT UR QL STRIP: NEGATIVE
SP GR UR STRIP: 1.02 (ref 1–1.03)
URN SPEC COLLECT METH UR: NORMAL

## 2023-09-22 PROCEDURE — 80076 HEPATIC FUNCTION PANEL: CPT | Performed by: FAMILY MEDICINE

## 2023-09-22 PROCEDURE — 80074 ACUTE HEPATITIS PANEL: CPT | Performed by: FAMILY MEDICINE

## 2023-09-22 PROCEDURE — 81003 URINALYSIS AUTO W/O SCOPE: CPT | Performed by: FAMILY MEDICINE

## 2023-09-22 PROCEDURE — 82977 ASSAY OF GGT: CPT | Performed by: FAMILY MEDICINE

## 2023-09-22 PROCEDURE — 86663 EPSTEIN-BARR ANTIBODY: CPT | Performed by: FAMILY MEDICINE

## 2023-09-22 PROCEDURE — 86665 EPSTEIN-BARR CAPSID VCA: CPT | Performed by: FAMILY MEDICINE

## 2023-09-23 LAB
ALBUMIN SERPL BCP-MCNC: 3.4 G/DL (ref 3.5–5.2)
ALP SERPL-CCNC: 273 U/L (ref 55–135)
ALT SERPL W/O P-5'-P-CCNC: 441 U/L (ref 10–44)
AST SERPL-CCNC: 221 U/L (ref 10–40)
BILIRUB DIRECT SERPL-MCNC: 0.7 MG/DL (ref 0.1–0.3)
BILIRUB SERPL-MCNC: 1 MG/DL (ref 0.1–1)
GGT SERPL-CCNC: 451 U/L (ref 8–55)
HAV IGM SERPL QL IA: NORMAL
HBV CORE IGM SERPL QL IA: NORMAL
HBV SURFACE AG SERPL QL IA: NORMAL
HCV AB SERPL QL IA: NORMAL
PROT SERPL-MCNC: 7.6 G/DL (ref 6–8.4)

## 2023-09-25 LAB
EBV EA IGG SER-ACNC: <5 U/ML
EBV NA IGG SER-ACNC: 233 U/ML
EBV VCA IGG SER-ACNC: 130 U/ML
EBV VCA IGM SER-ACNC: <10 U/ML

## 2023-09-26 ENCOUNTER — OFFICE VISIT (OUTPATIENT)
Dept: INTERNAL MEDICINE | Facility: CLINIC | Age: 43
End: 2023-09-26
Payer: COMMERCIAL

## 2023-09-26 VITALS
TEMPERATURE: 97 F | SYSTOLIC BLOOD PRESSURE: 128 MMHG | WEIGHT: 293 LBS | BODY MASS INDEX: 45.99 KG/M2 | HEIGHT: 67 IN | DIASTOLIC BLOOD PRESSURE: 72 MMHG | OXYGEN SATURATION: 98 % | HEART RATE: 75 BPM

## 2023-09-26 DIAGNOSIS — R79.89 ELEVATED LFTS: Primary | ICD-10-CM

## 2023-09-26 PROCEDURE — 99214 OFFICE O/P EST MOD 30 MIN: CPT | Mod: S$GLB,,, | Performed by: FAMILY MEDICINE

## 2023-09-26 PROCEDURE — 1160F PR REVIEW ALL MEDS BY PRESCRIBER/CLIN PHARMACIST DOCUMENTED: ICD-10-PCS | Mod: CPTII,S$GLB,, | Performed by: FAMILY MEDICINE

## 2023-09-26 PROCEDURE — 1159F MED LIST DOCD IN RCRD: CPT | Mod: CPTII,S$GLB,, | Performed by: FAMILY MEDICINE

## 2023-09-26 PROCEDURE — 99999 PR PBB SHADOW E&M-EST. PATIENT-LVL IV: CPT | Mod: PBBFAC,,, | Performed by: FAMILY MEDICINE

## 2023-09-26 PROCEDURE — 3078F PR MOST RECENT DIASTOLIC BLOOD PRESSURE < 80 MM HG: ICD-10-PCS | Mod: CPTII,S$GLB,, | Performed by: FAMILY MEDICINE

## 2023-09-26 PROCEDURE — 3044F HG A1C LEVEL LT 7.0%: CPT | Mod: CPTII,S$GLB,, | Performed by: FAMILY MEDICINE

## 2023-09-26 PROCEDURE — 3074F SYST BP LT 130 MM HG: CPT | Mod: CPTII,S$GLB,, | Performed by: FAMILY MEDICINE

## 2023-09-26 PROCEDURE — 3008F PR BODY MASS INDEX (BMI) DOCUMENTED: ICD-10-PCS | Mod: CPTII,S$GLB,, | Performed by: FAMILY MEDICINE

## 2023-09-26 PROCEDURE — 99999 PR PBB SHADOW E&M-EST. PATIENT-LVL IV: ICD-10-PCS | Mod: PBBFAC,,, | Performed by: FAMILY MEDICINE

## 2023-09-26 PROCEDURE — 1159F PR MEDICATION LIST DOCUMENTED IN MEDICAL RECORD: ICD-10-PCS | Mod: CPTII,S$GLB,, | Performed by: FAMILY MEDICINE

## 2023-09-26 PROCEDURE — 3074F PR MOST RECENT SYSTOLIC BLOOD PRESSURE < 130 MM HG: ICD-10-PCS | Mod: CPTII,S$GLB,, | Performed by: FAMILY MEDICINE

## 2023-09-26 PROCEDURE — 3078F DIAST BP <80 MM HG: CPT | Mod: CPTII,S$GLB,, | Performed by: FAMILY MEDICINE

## 2023-09-26 PROCEDURE — 3008F BODY MASS INDEX DOCD: CPT | Mod: CPTII,S$GLB,, | Performed by: FAMILY MEDICINE

## 2023-09-26 PROCEDURE — 1160F RVW MEDS BY RX/DR IN RCRD: CPT | Mod: CPTII,S$GLB,, | Performed by: FAMILY MEDICINE

## 2023-09-26 PROCEDURE — 3044F PR MOST RECENT HEMOGLOBIN A1C LEVEL <7.0%: ICD-10-PCS | Mod: CPTII,S$GLB,, | Performed by: FAMILY MEDICINE

## 2023-09-26 PROCEDURE — 99214 PR OFFICE/OUTPT VISIT, EST, LEVL IV, 30-39 MIN: ICD-10-PCS | Mod: S$GLB,,, | Performed by: FAMILY MEDICINE

## 2023-09-26 NOTE — PROGRESS NOTES
Subjective:   Patient ID: Yamila Langley is a 43 y.o. female.  Chief Complaint:  Results    Presents for follow-up on abnormal liver function test   Patient seen 9/14/2023 and placed on Bactrim for vaginal abscess   Evaluated in clinic 9/18/2023 for fever and swelling  Concerned because possible jaundice at that time   Labs done showed elevated alk-phos, AST/ALT in the 300-400.  Bilirubin elevated.  Renal function normal.    Urinalysis with blood but on cycle.  Otherwise concentrated unremarkable.  Liver function tests were normal April 2023 during her physical exam   HCTZ and Zoloft for chronic medications with no recent increase in dose  Bactrim was the only new medication  PRIETO panel was added which was unremarkable  Patient still has her gallbladder intact   Does have history of gastric bypass surgery  No liver gallbladder imaging on file   Prior to today's visit had hepatitis panel which was negative, EBV panel which is consistent with old/previous infection, and repeat liver function tests which show improvement in bilirubin but persistent elevations in alk-phos, AST, ALT  Clinically she feels normal bacteria baseline  Denies taking any supplements   Denies taking Tylenol in the past 24-48 hours    Review of Systems   Constitutional:  Negative for chills, fatigue and fever.   HENT:  Negative for congestion, dental problem, ear pain, postnasal drip, rhinorrhea, sinus pressure and sore throat.    Eyes:  Negative for visual disturbance.   Respiratory:  Negative for cough, chest tightness, shortness of breath and wheezing.    Cardiovascular:  Negative for chest pain, palpitations and leg swelling.   Gastrointestinal:  Negative for abdominal pain, blood in stool, constipation, diarrhea, nausea and vomiting.   Endocrine: Negative for polydipsia, polyphagia and polyuria.   Genitourinary:  Negative for difficulty urinating, dysuria, flank pain, hematuria and pelvic pain.   Musculoskeletal:  Negative for  "myalgias.   Skin:  Negative for rash.   Neurological:  Negative for dizziness, syncope, weakness, light-headedness and headaches.   Hematological:  Negative for adenopathy.   Psychiatric/Behavioral:  Negative for decreased concentration, dysphoric mood and sleep disturbance. The patient is not nervous/anxious.        Objective:   /72 (BP Location: Right arm, Patient Position: Sitting, BP Method: Large (Manual))   Pulse 75   Temp 96.9 °F (36.1 °C) (Tympanic)   Ht 5' 7" (1.702 m)   Wt (!) 162.8 kg (358 lb 14.5 oz)   LMP 09/14/2023 (Exact Date)   SpO2 98%   BMI 56.21 kg/m²     Physical Exam  Vitals and nursing note reviewed.   Constitutional:       Appearance: Normal appearance. She is well-developed. She is morbidly obese.   Eyes:      General: No scleral icterus.  Cardiovascular:      Rate and Rhythm: Normal rate and regular rhythm.   Pulmonary:      Effort: Pulmonary effort is normal.   Abdominal:      General: Abdomen is protuberant. There is no distension.      Palpations: Abdomen is soft.      Tenderness: There is no abdominal tenderness. There is no right CVA tenderness, left CVA tenderness, guarding or rebound.   Musculoskeletal:      Right lower leg: No edema.      Left lower leg: No edema.   Skin:     Findings: No rash.   Neurological:      Mental Status: She is alert.   Psychiatric:         Mood and Affect: Mood and affect normal.       Assessment:       ICD-10-CM ICD-9-CM   1. Elevated LFTs  R79.89 790.6     Plan:   Elevated LFTs  -     US Abdomen Limited; Future; Expected date: 09/26/2023  -     Ambulatory referral/consult to Hepatology; Future; Expected date: 10/03/2023    Unclear etiology for elevated LFTs   Check ultrasound to rule out gallbladder disease causing obstruction   Could be related to previous surgery and scar tissue   However, if imaging normal would be concern for cholangitis or primary biliary cirrhosis   Referral to hepatology ordered   Patient expresses agreement " understanding with the above plan  Return to clinic 6 months for annual physical exam or sooner if needed    30+ minutes of total time spent on the encounter, which includes face to face time and non-face to face time preparing to see the patient (eg, review of tests), Obtaining and/or reviewing separately obtained history, documenting clinical information in the electronic or other health record, independently interpreting results (not separately reported) and communicating results to the patient/family/caregiver, or Care coordination (not separately reported).

## 2023-09-27 ENCOUNTER — PATIENT MESSAGE (OUTPATIENT)
Dept: HEPATOLOGY | Facility: CLINIC | Age: 43
End: 2023-09-27
Payer: COMMERCIAL

## 2023-10-09 ENCOUNTER — HOSPITAL ENCOUNTER (OUTPATIENT)
Dept: RADIOLOGY | Facility: HOSPITAL | Age: 43
Discharge: HOME | End: 2023-10-09
Attending: FAMILY MEDICINE
Payer: COMMERCIAL

## 2023-10-09 DIAGNOSIS — R79.89 ELEVATED LFTS: ICD-10-CM

## 2023-10-09 PROCEDURE — 76705 US ABDOMEN LIMITED: ICD-10-PCS | Mod: 26,,, | Performed by: RADIOLOGY

## 2023-10-09 PROCEDURE — 76705 ECHO EXAM OF ABDOMEN: CPT | Mod: 26,,, | Performed by: RADIOLOGY

## 2023-10-09 PROCEDURE — 76705 ECHO EXAM OF ABDOMEN: CPT | Mod: TC

## 2023-10-10 DIAGNOSIS — R79.89 ELEVATED LFTS: Primary | ICD-10-CM

## 2023-10-10 DIAGNOSIS — K83.8 BILIARY SLUDGE DETERMINED BY ULTRASOUND: ICD-10-CM

## 2023-10-25 ENCOUNTER — PATIENT MESSAGE (OUTPATIENT)
Dept: HEPATOLOGY | Facility: CLINIC | Age: 43
End: 2023-10-25
Payer: COMMERCIAL

## 2023-11-06 ENCOUNTER — PATIENT MESSAGE (OUTPATIENT)
Dept: HEPATOLOGY | Facility: CLINIC | Age: 43
End: 2023-11-06
Payer: COMMERCIAL

## 2024-05-01 ENCOUNTER — PATIENT MESSAGE (OUTPATIENT)
Dept: INTERNAL MEDICINE | Facility: CLINIC | Age: 44
End: 2024-05-01
Payer: COMMERCIAL

## 2024-05-01 ENCOUNTER — TELEPHONE (OUTPATIENT)
Dept: INTERNAL MEDICINE | Facility: CLINIC | Age: 44
End: 2024-05-01
Payer: COMMERCIAL

## 2024-05-01 DIAGNOSIS — F33.0 MAJOR DEPRESSIVE DISORDER, RECURRENT, MILD: ICD-10-CM

## 2024-05-01 DIAGNOSIS — F41.1 GENERALIZED ANXIETY DISORDER: ICD-10-CM

## 2024-05-01 DIAGNOSIS — I10 ESSENTIAL HYPERTENSION: Chronic | ICD-10-CM

## 2024-05-01 NOTE — TELEPHONE ENCOUNTER
----- Message from Kirsten Rock sent at 5/1/2024  9:12 AM CDT -----  Contact: pt  Pt is calling in rgd to needing to email health examination for adoptive parents.  She would need to have it filled out by this Friday.  Please call her back at [phone]  thanks/mpd

## 2024-05-01 NOTE — TELEPHONE ENCOUNTER
No care due was identified.  [facility] Embedded Care Due Messages. Reference number: [id number].   5/01/2024 12:45:24 PM CDT

## 2024-05-01 NOTE — TELEPHONE ENCOUNTER
Attempt to contact pt regarding forms that needed to be completed; no answer; lvm for callback /LD

## 2024-05-02 RX ORDER — HYDROCHLOROTHIAZIDE 25 MG/1
25 TABLET ORAL DAILY
Qty: 90 TABLET | Refills: 1 | Status: SHIPPED | OUTPATIENT
Start: 2024-05-02 | End: 2024-10-29

## 2024-05-02 RX ORDER — SERTRALINE HYDROCHLORIDE 50 MG/1
50 TABLET, FILM COATED ORAL DAILY
Qty: 90 TABLET | Refills: 1 | Status: SHIPPED | OUTPATIENT
Start: 2024-05-02 | End: 2024-10-29

## 2024-05-02 NOTE — TELEPHONE ENCOUNTER
Refill Decision Note   Yamila Langley  is requesting a refill authorization.  Brief Assessment and Rationale for Refill:  Approve     Medication Therapy Plan:         Comments:     Note composed:6:16 AM 05/02/2024

## 2024-05-29 DIAGNOSIS — Z12.31 OTHER SCREENING MAMMOGRAM: ICD-10-CM

## 2024-07-15 ENCOUNTER — TELEPHONE (OUTPATIENT)
Dept: INTERNAL MEDICINE | Facility: CLINIC | Age: 44
End: 2024-07-15
Payer: COMMERCIAL

## 2024-07-15 NOTE — TELEPHONE ENCOUNTER
----- Message from Binh Alonso sent at 7/15/2024  9:33 AM CDT -----  Contact: self  ..Type:  Sooner Apoointment Request    Caller is requesting a sooner appointment.  Caller declined first available appointment listed below.  Caller will not accept being placed on the waitlist and is requesting a message be sent to doctor.  Name of Caller:.Yamila Langley  When is the first available appointment?  Symptoms:PRE-OP  Would the patient rather a call back or a response via [facility]?  Call back   Best Call Back Number:.[phone]   Additional Information: pt states procedure is scheduled for next Wednesday

## 2024-09-18 DIAGNOSIS — R73.03 PREDIABETES: ICD-10-CM

## 2024-10-01 ENCOUNTER — LAB VISIT (OUTPATIENT)
Dept: LAB | Facility: HOSPITAL | Age: 44
End: 2024-10-01
Payer: COMMERCIAL

## 2024-10-01 ENCOUNTER — HOSPITAL ENCOUNTER (OUTPATIENT)
Dept: CARDIOLOGY | Facility: HOSPITAL | Age: 44
Discharge: HOME | End: 2024-10-01
Attending: FAMILY MEDICINE
Payer: COMMERCIAL

## 2024-10-01 ENCOUNTER — OFFICE VISIT (OUTPATIENT)
Dept: INTERNAL MEDICINE | Facility: CLINIC | Age: 44
End: 2024-10-01
Payer: COMMERCIAL

## 2024-10-01 VITALS
SYSTOLIC BLOOD PRESSURE: 134 MMHG | DIASTOLIC BLOOD PRESSURE: 82 MMHG | HEART RATE: 83 BPM | HEIGHT: 67 IN | WEIGHT: 293 LBS | OXYGEN SATURATION: 98 % | BODY MASS INDEX: 45.99 KG/M2

## 2024-10-01 DIAGNOSIS — Z01.818 PREOP EXAMINATION: ICD-10-CM

## 2024-10-01 DIAGNOSIS — E66.01 MORBID OBESITY WITH BMI OF 50.0-59.9, ADULT: Chronic | ICD-10-CM

## 2024-10-01 DIAGNOSIS — Z13.6 ENCOUNTER FOR SCREENING FOR CARDIOVASCULAR DISORDERS: ICD-10-CM

## 2024-10-01 DIAGNOSIS — S83.241S OTHER TEAR OF MEDIAL MENISCUS OF RIGHT KNEE AS CURRENT INJURY, SEQUELA: Primary | ICD-10-CM

## 2024-10-01 DIAGNOSIS — Z98.84 H/O GASTRIC BYPASS: Chronic | ICD-10-CM

## 2024-10-01 DIAGNOSIS — F33.0 MAJOR DEPRESSIVE DISORDER, RECURRENT, MILD: ICD-10-CM

## 2024-10-01 DIAGNOSIS — I10 ESSENTIAL HYPERTENSION: Chronic | ICD-10-CM

## 2024-10-01 DIAGNOSIS — F41.1 GENERALIZED ANXIETY DISORDER: ICD-10-CM

## 2024-10-01 DIAGNOSIS — I10 PRIMARY HYPERTENSION: Chronic | ICD-10-CM

## 2024-10-01 DIAGNOSIS — R73.03 PREDIABETES: Chronic | ICD-10-CM

## 2024-10-01 LAB
BILIRUB UR QL STRIP: NEGATIVE
CLARITY UR: CLEAR
COLOR UR: YELLOW
GLUCOSE UR QL STRIP: NEGATIVE
HGB UR QL STRIP: NEGATIVE
KETONES UR QL STRIP: NEGATIVE
LEUKOCYTE ESTERASE UR QL STRIP: NEGATIVE
NITRITE UR QL STRIP: NEGATIVE
OHS QRS DURATION: 120 MS
OHS QTC CALCULATION: 456 MS
PH UR STRIP: 6 [PH] (ref 5–8)
PROT UR QL STRIP: NEGATIVE
SP GR UR STRIP: >=1.03 (ref 1–1.03)
URN SPEC COLLECT METH UR: ABNORMAL

## 2024-10-01 PROCEDURE — 1159F MED LIST DOCD IN RCRD: CPT | Mod: CPTII,S$GLB,, | Performed by: FAMILY MEDICINE

## 2024-10-01 PROCEDURE — G2211 COMPLEX E/M VISIT ADD ON: HCPCS | Mod: S$GLB,,, | Performed by: FAMILY MEDICINE

## 2024-10-01 PROCEDURE — 1160F RVW MEDS BY RX/DR IN RCRD: CPT | Mod: CPTII,S$GLB,, | Performed by: FAMILY MEDICINE

## 2024-10-01 PROCEDURE — 3075F SYST BP GE 130 - 139MM HG: CPT | Mod: CPTII,S$GLB,, | Performed by: FAMILY MEDICINE

## 2024-10-01 PROCEDURE — 93005 ELECTROCARDIOGRAM TRACING: CPT

## 2024-10-01 PROCEDURE — 93010 ELECTROCARDIOGRAM REPORT: CPT | Mod: ,,, | Performed by: INTERNAL MEDICINE

## 2024-10-01 PROCEDURE — 99215 OFFICE O/P EST HI 40 MIN: CPT | Mod: S$GLB,,, | Performed by: FAMILY MEDICINE

## 2024-10-01 PROCEDURE — 3008F BODY MASS INDEX DOCD: CPT | Mod: CPTII,S$GLB,, | Performed by: FAMILY MEDICINE

## 2024-10-01 PROCEDURE — 3079F DIAST BP 80-89 MM HG: CPT | Mod: CPTII,S$GLB,, | Performed by: FAMILY MEDICINE

## 2024-10-01 PROCEDURE — 99999 PR PBB SHADOW E&M-EST. PATIENT-LVL III: CPT | Mod: PBBFAC,,, | Performed by: FAMILY MEDICINE

## 2024-10-01 PROCEDURE — 81003 URINALYSIS AUTO W/O SCOPE: CPT | Performed by: FAMILY MEDICINE

## 2024-10-01 RX ORDER — HYDROCHLOROTHIAZIDE 25 MG/1
25 TABLET ORAL DAILY
Qty: 90 TABLET | Refills: 3 | Status: SHIPPED | OUTPATIENT
Start: 2024-10-01 | End: 2025-10-01

## 2024-10-01 RX ORDER — SERTRALINE HYDROCHLORIDE 50 MG/1
50 TABLET, FILM COATED ORAL DAILY
Qty: 90 TABLET | Refills: 3 | Status: SHIPPED | OUTPATIENT
Start: 2024-10-01 | End: 2025-10-01

## 2024-10-01 NOTE — PROGRESS NOTES
Subjective:   Patient ID: Yamila Langley is a 44 y.o. female.  Chief Complaint:  Pre-op Exam    Presents for preop exam/annual physical exam   Last annual exam March 2023    Right medial meniscal tear   Plan for right knee arthroscopy   General anesthesia   Dr. Iva Mayo  October 11, 2024     Medical History:  - Hypertension.  Controlled.  On HCTZ 25 mg daily.  Reports compliance.  Denies side effects.  No shortness of breath or swelling.    - Prediabetes.  No medications.  Denies symptoms hypo or hyperglycemia.    - Depression/Anxiety.  On Zoloft 50 mg daily.  .  Reports compliance.  Denies side effects.  Mood and symptoms stable and well controlled.  - Morbid obesity.  History gastric bypass.  Reports despite improved diet, regular exercise, and other lifestyle changes inability to lose weight.  Would like treatment with Rio P1 weekly injections after surgery.    Health Maintenance:  - Needs gyn exam   - Needs mammogram  - Tetanus booster up-to-date   - No previous COVID vaccines   - No recent flu vaccines   - Previous hepatitis-C and HIV screens negative    No active cardiac complaints   No active pulmonary complaints or history of pulmonary problems   No current URTI or other suspicion for underlying infection   No rash, open sores, or open wounds      Review of Systems   Constitutional:  Negative for chills, diaphoresis, fatigue and fever.   HENT:  Negative for congestion, dental problem, ear pain, postnasal drip, rhinorrhea, sinus pressure and sore throat.    Eyes:  Negative for visual disturbance.   Respiratory:  Negative for cough, chest tightness, shortness of breath and wheezing.    Cardiovascular:  Negative for chest pain, palpitations and leg swelling.   Gastrointestinal:  Negative for abdominal distention, abdominal pain, blood in stool, constipation, diarrhea, nausea and vomiting.   Endocrine: Negative for polydipsia, polyphagia and polyuria.   Genitourinary:  Negative for difficulty  "urinating, dysuria, flank pain, frequency, hematuria, pelvic pain and urgency.   Musculoskeletal:  Positive for arthralgias, gait problem and joint swelling. Negative for myalgias.   Skin:  Negative for rash.   Neurological:  Negative for dizziness, syncope, weakness, light-headedness, numbness and headaches.   Hematological:  Negative for adenopathy.   Psychiatric/Behavioral:  Negative for decreased concentration, dysphoric mood and sleep disturbance. The patient is not nervous/anxious.        Objective:   /82 (BP Location: Left arm)   Pulse 83   Ht 5' 7" (1.702 m)   Wt (!) 163.9 kg (361 lb 5.3 oz)   SpO2 98%   BMI 56.59 kg/m²     Physical Exam  Vitals and nursing note reviewed.   Constitutional:       Appearance: Normal appearance. She is well-developed. She is morbidly obese.   HENT:      Right Ear: Tympanic membrane and ear canal normal.      Left Ear: Tympanic membrane and ear canal normal.      Mouth/Throat:      Pharynx: Oropharynx is clear. Uvula midline.   Neck:      Thyroid: No thyroid mass, thyromegaly or thyroid tenderness.      Vascular: No JVD.   Cardiovascular:      Rate and Rhythm: Normal rate and regular rhythm.      Heart sounds: Normal heart sounds.   Pulmonary:      Effort: Pulmonary effort is normal.      Breath sounds: Normal breath sounds.   Abdominal:      General: There is no distension.      Palpations: Abdomen is soft.      Tenderness: There is no abdominal tenderness.   Musculoskeletal:      Right lower leg: No edema.      Left lower leg: No edema.   Skin:     Findings: No rash.   Neurological:      Mental Status: She is alert and oriented to person, place, and time.   Psychiatric:         Mood and Affect: Mood and affect normal.         Assessment:       ICD-10-CM ICD-9-CM   1. Other tear of medial meniscus of right knee as current injury, sequela  S83.241S 905.7   2. Preop examination  Z01.818 V72.84   3. Encounter for screening for cardiovascular disorders  Z13.6 V81.2   4. " Primary hypertension  I10 401.9   5. Prediabetes  R73.03 790.29   6. Major depressive disorder, recurrent, mild  F33.0 296.31   7. Generalized anxiety disorder  F41.1 300.02   8. Morbid obesity with BMI of 50.0-59.9, adult  E66.01 278.01    Z68.43 V85.43   9. H/O gastric bypass  Z98.84 V45.86   10. Essential hypertension  I10 401.9     Plan:   Other tear of medial meniscus of right knee as current injury, sequela  Preop examination  -     CBC Without Differential; Future; Expected date: 10/01/2024  -     Comprehensive Metabolic Panel; Future; Expected date: 10/01/2024  -     Urinalysis, Reflex to Urine Culture Urine, Clean Catch; Future; Expected date: 10/01/2024  -     EKG 12-lead; Future; Expected date: 10/01/2024  Based on the history and physical exam I performed and review of the data presently available to me, low risk for perioperative cardiac morbidity and mortality associated with the planned operation and may proceed without further evaluation or delay.    Encounter for screening for cardiovascular disorders  -     Lipid Panel; Future; Expected date: 10/01/2024  Check lipid panel   Start statin if indicated     Primary hypertension  -     hydroCHLOROthiazide (HYDRODIURIL) 25 MG tablet; Take 1 tablet (25 mg total) by mouth once daily.  Dispense: 90 tablet; Refill: 3  Controlled rate stable.  Asymptomatic.  BP at goal.    Continue current medication     Prediabetes  -     Hemoglobin A1C; Future; Expected date: 10/01/2024  Asymptomatic   If A1c greater than 6.5%, start weekly GLP1 injections   If A1c less than 6.5%, okay remain off medication     Major depressive disorder, recurrent, mild  Generalized anxiety disorder  -     sertraline (ZOLOFT) 50 MG tablet; Take 1 tablet (50 mg total) by mouth once daily.  Dispense: 90 tablet; Refill: 3  Stable, no side effects, mood and symptoms well controlled on present medication .  Continue current medication    Morbid obesity with BMI of 50.0-59.9, adult  H/O gastric  bypass  -     Vitamin B12; Future; Expected date: 10/01/2024  -     Folate; Future; Expected date: 10/01/2024  -     VITAMIN B1; Future; Expected date: 10/01/2024  -     Vitamin D; Future; Expected date: 10/01/2024  -     Iron and TIBC; Future; Expected date: 10/01/2024  -     Ferritin; Future; Expected date: 10/01/2024  Check vitamin levels   Supplement as indicated   Consider treatment with Wegovy or Zepbound after recovers from upcoming surgery    Routine health maintenance   Plans to schedule gyn exam and mammogram after surgery   Declines COVID booster and flu vaccine     Return to clinic 1 year for annual exam or sooner as needed    45 minutes of total time spent on the encounter, which includes face to face time and non-face to face time preparing to see the patient (eg, review of tests), Obtaining and/or reviewing separately obtained history, documenting clinical information in the electronic or other health record, independently interpreting results (not separately reported) and communicating results to the patient/family/caregiver, or Care coordination (not separately reported).     Visit today included increased complexity associated with managing the longitudinal care of the patient due to the serious and/or complex managed problem(s) listed above.

## 2024-10-04 ENCOUNTER — TELEPHONE (OUTPATIENT)
Dept: INTERNAL MEDICINE | Facility: CLINIC | Age: 44
End: 2024-10-04
Payer: COMMERCIAL

## 2025-03-20 ENCOUNTER — OFFICE VISIT (OUTPATIENT)
Dept: URGENT CARE | Facility: CLINIC | Age: 45
End: 2025-03-20
Payer: COMMERCIAL

## 2025-03-20 VITALS
SYSTOLIC BLOOD PRESSURE: 140 MMHG | BODY MASS INDEX: 43.4 KG/M2 | DIASTOLIC BLOOD PRESSURE: 85 MMHG | OXYGEN SATURATION: 97 % | HEART RATE: 73 BPM | RESPIRATION RATE: 18 BRPM | HEIGHT: 69 IN | WEIGHT: 293 LBS | TEMPERATURE: 98 F

## 2025-03-20 DIAGNOSIS — H65.02 NON-RECURRENT ACUTE SEROUS OTITIS MEDIA OF LEFT EAR: Primary | ICD-10-CM

## 2025-03-20 PROCEDURE — 99213 OFFICE O/P EST LOW 20 MIN: CPT | Mod: S$GLB,,, | Performed by: PHYSICIAN ASSISTANT

## 2025-03-20 RX ORDER — AMOXICILLIN 875 MG/1
875 TABLET, FILM COATED ORAL 2 TIMES DAILY
Qty: 14 TABLET | Refills: 0 | Status: SHIPPED | OUTPATIENT
Start: 2025-03-20 | End: 2025-03-27

## 2025-03-20 NOTE — PROGRESS NOTES
"Subjective:      Patient ID: Yamila Langley is a 45 y.o. female.    Vitals:  height is 5' 9" (1.753 m) and weight is 162.9 kg (359 lb 2.1 oz) (abnormal). Her oral temperature is 97.7 °F (36.5 °C). Her blood pressure is 140/85 (abnormal) and her pulse is 73. Her respiration is 18 and oxygen saturation is 97%.     Chief Complaint: Sinus Problem    Pt presents to the clinic today with nasal congestion and post nasal drip, sore throat, dry cough, ear pressure and sinus pressure that all began 5-6 days ago.    Sinus Problem  This is a new problem. The current episode started in the past 7 days. The problem has been gradually worsening since onset. There has been no fever. Her pain is at a severity of 6/10 (Sinus pressure). The pain is moderate. Associated symptoms include congestion, coughing, sinus pressure and sneezing. Pertinent negatives include no chills, diaphoresis, ear pain, headaches, hoarse voice, neck pain, shortness of breath, sore throat or swollen glands. Treatments tried: OTC cold meds. The treatment provided no relief.       Constitution: Negative for chills and sweating.   HENT:  Positive for congestion and sinus pressure. Negative for ear pain and sore throat.    Neck: Negative for neck pain.   Respiratory:  Positive for cough. Negative for shortness of breath.    Allergic/Immunologic: Positive for sneezing.   Neurological:  Negative for headaches.      Objective:     Physical Exam   Constitutional: obesity  HENT:   Head: Normocephalic.   Ears:   Right Ear: Tympanic membrane, external ear and ear canal normal.   Left Ear: Tympanic membrane is injected, erythematous and bulging.   Nose: Rhinorrhea and congestion present.   Mouth/Throat: Posterior oropharyngeal erythema present. No oropharyngeal exudate.   Cardiovascular: Normal rate and normal pulses.   No murmur heard.Exam reveals no gallop.   Pulmonary/Chest: No respiratory distress. She has no wheezes. She has no rales.   Abdominal: She " exhibits no distension. There is no abdominal tenderness.   Neurological: She is alert.   Nursing note and vitals reviewed.      Assessment:     1. Non-recurrent acute serous otitis media of left ear      Here with congestion and sore throat. Her symptoms started 5 days ago. She has been taking photos at school so could of been exposed to covid or flu. She denies fever. I will test for covid. She is negative for covid. She is suffering from otitis media and I will start her on amoxicillin. She was instructed to hydrate and return to the clinic if new or worsening symptoms occur.      Plan:       Non-recurrent acute serous otitis media of left ear  -     amoxicillin (AMOXIL) 875 MG tablet; Take 1 tablet (875 mg total) by mouth 2 (two) times a day. for 7 days  Dispense: 14 tablet; Refill: 0

## 2025-04-28 ENCOUNTER — PATIENT OUTREACH (OUTPATIENT)
Dept: ADMINISTRATIVE | Facility: HOSPITAL | Age: 45
End: 2025-04-28
Payer: COMMERCIAL

## 2025-08-13 ENCOUNTER — PATIENT MESSAGE (OUTPATIENT)
Dept: ADMINISTRATIVE | Facility: HOSPITAL | Age: 45
End: 2025-08-13
Payer: COMMERCIAL